# Patient Record
Sex: MALE | Race: WHITE | Employment: OTHER | URBAN - METROPOLITAN AREA
[De-identification: names, ages, dates, MRNs, and addresses within clinical notes are randomized per-mention and may not be internally consistent; named-entity substitution may affect disease eponyms.]

---

## 2023-08-23 PROBLEM — I77.89 ASCENDING AORTA ENLARGEMENT (CMS-HCC): Status: ACTIVE | Noted: 2023-08-23

## 2023-08-23 PROBLEM — K63.5 COLON POLYPS: Status: ACTIVE | Noted: 2023-08-23

## 2023-08-23 PROBLEM — D64.9 ANEMIA: Status: ACTIVE | Noted: 2023-08-23

## 2023-08-23 PROBLEM — R35.0 URINARY FREQUENCY: Status: ACTIVE | Noted: 2023-08-23

## 2023-08-23 PROBLEM — M25.551 CHRONIC RIGHT HIP PAIN: Status: ACTIVE | Noted: 2023-08-23

## 2023-08-23 PROBLEM — M54.41 CHRONIC RIGHT-SIDED LOW BACK PAIN WITH RIGHT-SIDED SCIATICA: Status: ACTIVE | Noted: 2023-08-23

## 2023-08-23 PROBLEM — M48.061 LUMBAR SPINAL STENOSIS: Status: ACTIVE | Noted: 2023-08-23

## 2023-08-23 PROBLEM — M51.36 DEGENERATION OF LUMBAR INTERVERTEBRAL DISC: Status: ACTIVE | Noted: 2023-08-23

## 2023-08-23 PROBLEM — L82.1 OTHER SEBORRHEIC KERATOSIS: Status: ACTIVE | Noted: 2020-03-02

## 2023-08-23 PROBLEM — R93.1 AGATSTON CAC SCORE, <100: Status: ACTIVE | Noted: 2023-08-23

## 2023-08-23 PROBLEM — M51.369 DEGENERATION OF LUMBAR INTERVERTEBRAL DISC: Status: ACTIVE | Noted: 2023-08-23

## 2023-08-23 PROBLEM — R73.03 PREDIABETES: Status: ACTIVE | Noted: 2023-08-23

## 2023-08-23 PROBLEM — G89.29 CHRONIC RIGHT-SIDED LOW BACK PAIN WITH RIGHT-SIDED SCIATICA: Status: ACTIVE | Noted: 2023-08-23

## 2023-08-23 PROBLEM — E78.00 HYPERCHOLESTEROLEMIA: Status: ACTIVE | Noted: 2023-08-23

## 2023-08-23 PROBLEM — G89.29 CHRONIC RIGHT HIP PAIN: Status: ACTIVE | Noted: 2023-08-23

## 2023-08-23 PROBLEM — I35.1 MODERATE AORTIC REGURGITATION: Status: ACTIVE | Noted: 2023-08-23

## 2023-08-23 PROBLEM — T63.441A: Status: ACTIVE | Noted: 2023-08-23

## 2023-08-23 PROBLEM — D47.2 MGUS (MONOCLONAL GAMMOPATHY OF UNKNOWN SIGNIFICANCE): Status: ACTIVE | Noted: 2023-08-23

## 2023-08-23 PROBLEM — B00.9 HERPESVIRAL INFECTION, UNSPECIFIED: Status: ACTIVE | Noted: 2020-03-02

## 2023-08-23 RX ORDER — EPINEPHRINE 0.3 MG/.3ML
0.3 INJECTION SUBCUTANEOUS
COMMUNITY
Start: 2019-11-06

## 2023-09-07 ENCOUNTER — OFFICE VISIT (OUTPATIENT)
Dept: PRIMARY CARE | Facility: CLINIC | Age: 68
End: 2023-09-07
Payer: MEDICARE

## 2023-09-07 VITALS
HEART RATE: 62 BPM | DIASTOLIC BLOOD PRESSURE: 74 MMHG | WEIGHT: 182 LBS | SYSTOLIC BLOOD PRESSURE: 116 MMHG | BODY MASS INDEX: 26.96 KG/M2 | OXYGEN SATURATION: 96 % | HEIGHT: 69 IN

## 2023-09-07 DIAGNOSIS — Z00.00 ROUTINE GENERAL MEDICAL EXAMINATION AT HEALTH CARE FACILITY: Primary | ICD-10-CM

## 2023-09-07 DIAGNOSIS — R73.03 PREDIABETES: ICD-10-CM

## 2023-09-07 DIAGNOSIS — D47.2 MGUS (MONOCLONAL GAMMOPATHY OF UNKNOWN SIGNIFICANCE): ICD-10-CM

## 2023-09-07 DIAGNOSIS — E78.00 HYPERCHOLESTEROLEMIA: ICD-10-CM

## 2023-09-07 DIAGNOSIS — R35.0 URINARY FREQUENCY: ICD-10-CM

## 2023-09-07 DIAGNOSIS — I77.89 ASCENDING AORTA ENLARGEMENT (CMS-HCC): ICD-10-CM

## 2023-09-07 DIAGNOSIS — Z28.20 IMMUNIZATION NOT CARRIED OUT BECAUSE OF PATIENT DECISION: ICD-10-CM

## 2023-09-07 DIAGNOSIS — Z12.5 SCREENING FOR PROSTATE CANCER: ICD-10-CM

## 2023-09-07 DIAGNOSIS — I35.1 MODERATE AORTIC REGURGITATION: ICD-10-CM

## 2023-09-07 DIAGNOSIS — M48.061 SPINAL STENOSIS OF LUMBAR REGION WITHOUT NEUROGENIC CLAUDICATION: ICD-10-CM

## 2023-09-07 PROCEDURE — 1170F FXNL STATUS ASSESSED: CPT | Performed by: INTERNAL MEDICINE

## 2023-09-07 PROCEDURE — 1160F RVW MEDS BY RX/DR IN RCRD: CPT | Performed by: INTERNAL MEDICINE

## 2023-09-07 PROCEDURE — 99214 OFFICE O/P EST MOD 30 MIN: CPT | Performed by: INTERNAL MEDICINE

## 2023-09-07 PROCEDURE — 1159F MED LIST DOCD IN RCRD: CPT | Performed by: INTERNAL MEDICINE

## 2023-09-07 PROCEDURE — G0439 PPPS, SUBSEQ VISIT: HCPCS | Performed by: INTERNAL MEDICINE

## 2023-09-07 PROCEDURE — 1036F TOBACCO NON-USER: CPT | Performed by: INTERNAL MEDICINE

## 2023-09-07 ASSESSMENT — ACTIVITIES OF DAILY LIVING (ADL)
MANAGING_FINANCES: INDEPENDENT
DRESSING: INDEPENDENT
TAKING_MEDICATION: INDEPENDENT
BATHING: INDEPENDENT
DOING_HOUSEWORK: INDEPENDENT
GROCERY_SHOPPING: INDEPENDENT

## 2023-09-07 ASSESSMENT — ENCOUNTER SYMPTOMS
DIZZINESS: 0
FATIGUE: 0
SHORTNESS OF BREATH: 0
BACK PAIN: 0

## 2023-09-07 ASSESSMENT — PATIENT HEALTH QUESTIONNAIRE - PHQ9
1. LITTLE INTEREST OR PLEASURE IN DOING THINGS: NOT AT ALL
2. FEELING DOWN, DEPRESSED OR HOPELESS: NOT AT ALL
SUM OF ALL RESPONSES TO PHQ9 QUESTIONS 1 AND 2: 0

## 2023-09-07 NOTE — PROGRESS NOTES
"Subjective   Reason for Visit: Bib Dai is an 68 y.o. male here for a Medicare Wellness visit and chronic medical problems.    Past Medical, Surgical, and Family History reviewed and updated in chart.    Reviewed all medications by prescribing practitioner or clinical pharmacist (such as prescriptions, OTCs, herbal therapies and supplements) and documented in the medical record.    Doing well.  Still beekeeping.  Weight down 9 pounds.  More active this summer.  Eating more salads from garden.  No CP or SOB.  No back pain.  No meds, labs reviewed.    Patient Care Team:  Miguel Elizondo MD as PCP - General  Miguel Elizondo MD as PCP - [employer] PCP     Review of Systems   Constitutional:  Negative for fatigue.   Respiratory:  Negative for shortness of breath.    Cardiovascular:  Negative for chest pain.   Musculoskeletal:  Negative for back pain.   Neurological:  Negative for dizziness.       Objective   Vitals:  /74 (BP Location: Right arm, Patient Position: Sitting)   Pulse 62   Ht 1.753 m (5' 9\")   Wt 82.6 kg (182 lb)   SpO2 96%   BMI 26.88 kg/m²       Physical Exam  Constitutional:       Appearance: Normal appearance.   Cardiovascular:      Rate and Rhythm: Normal rate and regular rhythm.      Pulses: Normal pulses.      Heart sounds: Normal heart sounds.   Pulmonary:      Effort: Pulmonary effort is normal.      Breath sounds: Normal breath sounds.   Abdominal:      General: Abdomen is flat. Bowel sounds are normal.      Palpations: Abdomen is soft.   Skin:     Findings: Lesion present.      Comments: Discussed fu with derm for skin lesion.   Neurological:      General: No focal deficit present.      Mental Status: He is alert.   Psychiatric:         Mood and Affect: Mood normal.         Thought Content: Thought content normal.         Judgment: Judgment normal.       Assessment/Plan   Problem List Items Addressed This Visit          Cardiac and Vasculature    Ascending aorta " enlargement (CMS/HCC)    Current Assessment & Plan     Mild enlargement 3.9 cm, noted 2021.  Recheck echo.         Relevant Orders    Transthoracic Echo (TTE) Complete    CBC and Auto Differential    Comprehensive metabolic panel    Hypercholesterolemia    Relevant Orders    CBC and Auto Differential    Comprehensive metabolic panel    Lipid panel    TSH with reflex to Free T4 if abnormal    Moderate aortic regurgitation    Current Assessment & Plan     Mild to moderate AR.  Recheck echo.         Relevant Medications    EPINEPHrine 0.3 mg/0.3 mL injection syringe    Other Relevant Orders    Transthoracic Echo (TTE) Complete    CBC and Auto Differential    Comprehensive metabolic panel       Endocrine/Metabolic    Prediabetes    Current Assessment & Plan     Check A1c.         Relevant Orders    Hemoglobin A1c       Genitourinary and Reproductive    Urinary frequency    Relevant Orders    Urinalysis with Reflex Microscopic    Screening for prostate cancer - Primary    Relevant Orders    Prostate Specific Antigen, Screen       Hematology and Neoplasia    MGUS (monoclonal gammopathy of unknown significance)    Current Assessment & Plan     Condition andres, followed with hematology.         Relevant Orders    CBC and Auto Differential    Comprehensive metabolic panel       Infectious Diseases    Immunization not carried out because of patient decision       Neuro    Lumbar spinal stenosis    Current Assessment & Plan     Not an active problem at this time.

## 2023-09-12 ENCOUNTER — LAB (OUTPATIENT)
Dept: LAB | Facility: LAB | Age: 68
End: 2023-09-12
Payer: MEDICARE

## 2023-09-12 DIAGNOSIS — D47.2 MGUS (MONOCLONAL GAMMOPATHY OF UNKNOWN SIGNIFICANCE): ICD-10-CM

## 2023-09-12 DIAGNOSIS — E78.00 HYPERCHOLESTEROLEMIA: ICD-10-CM

## 2023-09-12 DIAGNOSIS — I77.89 ASCENDING AORTA ENLARGEMENT (CMS-HCC): ICD-10-CM

## 2023-09-12 DIAGNOSIS — I35.1 MODERATE AORTIC REGURGITATION: ICD-10-CM

## 2023-09-12 DIAGNOSIS — R73.03 PREDIABETES: ICD-10-CM

## 2023-09-12 DIAGNOSIS — Z12.5 SCREENING FOR PROSTATE CANCER: ICD-10-CM

## 2023-09-12 DIAGNOSIS — R35.0 URINARY FREQUENCY: ICD-10-CM

## 2023-09-12 LAB
ALANINE AMINOTRANSFERASE (SGPT) (U/L) IN SER/PLAS: 14 U/L (ref 10–52)
ALBUMIN (G/DL) IN SER/PLAS: 4 G/DL (ref 3.4–5)
ALKALINE PHOSPHATASE (U/L) IN SER/PLAS: 43 U/L (ref 33–136)
ANION GAP IN SER/PLAS: 12 MMOL/L (ref 10–20)
APPEARANCE, URINE: CLEAR
ASPARTATE AMINOTRANSFERASE (SGOT) (U/L) IN SER/PLAS: 15 U/L (ref 9–39)
BASOPHILS (10*3/UL) IN BLOOD BY AUTOMATED COUNT: 0.06 X10E9/L (ref 0–0.1)
BASOPHILS/100 LEUKOCYTES IN BLOOD BY AUTOMATED COUNT: 1.4 % (ref 0–2)
BILIRUBIN TOTAL (MG/DL) IN SER/PLAS: 0.8 MG/DL (ref 0–1.2)
BILIRUBIN, URINE: NEGATIVE
BLOOD, URINE: NEGATIVE
CALCIUM (MG/DL) IN SER/PLAS: 9.3 MG/DL (ref 8.6–10.6)
CARBON DIOXIDE, TOTAL (MMOL/L) IN SER/PLAS: 29 MMOL/L (ref 21–32)
CHLORIDE (MMOL/L) IN SER/PLAS: 105 MMOL/L (ref 98–107)
CHOLESTEROL (MG/DL) IN SER/PLAS: 195 MG/DL (ref 0–199)
CHOLESTEROL IN HDL (MG/DL) IN SER/PLAS: 48.7 MG/DL
CHOLESTEROL/HDL RATIO: 4
COLOR, URINE: YELLOW
CREATININE (MG/DL) IN SER/PLAS: 0.91 MG/DL (ref 0.5–1.3)
EOSINOPHILS (10*3/UL) IN BLOOD BY AUTOMATED COUNT: 0.26 X10E9/L (ref 0–0.7)
EOSINOPHILS/100 LEUKOCYTES IN BLOOD BY AUTOMATED COUNT: 6.2 % (ref 0–6)
ERYTHROCYTE DISTRIBUTION WIDTH (RATIO) BY AUTOMATED COUNT: 14 % (ref 11.5–14.5)
ERYTHROCYTE MEAN CORPUSCULAR HEMOGLOBIN CONCENTRATION (G/DL) BY AUTOMATED: 34.4 G/DL (ref 32–36)
ERYTHROCYTE MEAN CORPUSCULAR VOLUME (FL) BY AUTOMATED COUNT: 94 FL (ref 80–100)
ERYTHROCYTES (10*6/UL) IN BLOOD BY AUTOMATED COUNT: 4.34 X10E12/L (ref 4.5–5.9)
ESTIMATED AVERAGE GLUCOSE FOR HBA1C: 111 MG/DL
GFR MALE: >90 ML/MIN/1.73M2
GLUCOSE (MG/DL) IN SER/PLAS: 88 MG/DL (ref 74–99)
GLUCOSE, URINE: NEGATIVE MG/DL
HEMATOCRIT (%) IN BLOOD BY AUTOMATED COUNT: 40.7 % (ref 41–52)
HEMOGLOBIN (G/DL) IN BLOOD: 14 G/DL (ref 13.5–17.5)
HEMOGLOBIN A1C/HEMOGLOBIN TOTAL IN BLOOD: 5.5 %
IMMATURE GRANULOCYTES/100 LEUKOCYTES IN BLOOD BY AUTOMATED COUNT: 0.2 % (ref 0–0.9)
KETONES, URINE: NEGATIVE MG/DL
LDL: 133 MG/DL (ref 0–99)
LEUKOCYTE ESTERASE, URINE: NEGATIVE
LEUKOCYTES (10*3/UL) IN BLOOD BY AUTOMATED COUNT: 4.2 X10E9/L (ref 4.4–11.3)
LYMPHOCYTES (10*3/UL) IN BLOOD BY AUTOMATED COUNT: 0.95 X10E9/L (ref 1.2–4.8)
LYMPHOCYTES/100 LEUKOCYTES IN BLOOD BY AUTOMATED COUNT: 22.6 % (ref 13–44)
MONOCYTES (10*3/UL) IN BLOOD BY AUTOMATED COUNT: 0.34 X10E9/L (ref 0.1–1)
MONOCYTES/100 LEUKOCYTES IN BLOOD BY AUTOMATED COUNT: 8.1 % (ref 2–10)
NEUTROPHILS (10*3/UL) IN BLOOD BY AUTOMATED COUNT: 2.59 X10E9/L (ref 1.2–7.7)
NEUTROPHILS/100 LEUKOCYTES IN BLOOD BY AUTOMATED COUNT: 61.5 % (ref 40–80)
NITRITE, URINE: NEGATIVE
NRBC (PER 100 WBCS) BY AUTOMATED COUNT: 0 /100 WBC (ref 0–0)
PH, URINE: 7 (ref 5–8)
PLATELETS (10*3/UL) IN BLOOD AUTOMATED COUNT: 166 X10E9/L (ref 150–450)
POTASSIUM (MMOL/L) IN SER/PLAS: 5.2 MMOL/L (ref 3.5–5.3)
PROSTATE SPECIFIC ANTIGEN,SCREEN: 2.87 NG/ML (ref 0–4)
PROTEIN TOTAL: 6.4 G/DL (ref 6.4–8.2)
PROTEIN, URINE: NEGATIVE MG/DL
SODIUM (MMOL/L) IN SER/PLAS: 141 MMOL/L (ref 136–145)
SPECIFIC GRAVITY, URINE: 1.02 (ref 1–1.03)
THYROTROPIN (MIU/L) IN SER/PLAS BY DETECTION LIMIT <= 0.05 MIU/L: 1.41 MIU/L (ref 0.44–3.98)
TRIGLYCERIDE (MG/DL) IN SER/PLAS: 68 MG/DL (ref 0–149)
UREA NITROGEN (MG/DL) IN SER/PLAS: 13 MG/DL (ref 6–23)
UROBILINOGEN, URINE: <2 MG/DL (ref 0–1.9)
VLDL: 14 MG/DL (ref 0–40)

## 2023-09-12 PROCEDURE — 81003 URINALYSIS AUTO W/O SCOPE: CPT

## 2023-09-12 PROCEDURE — 80053 COMPREHEN METABOLIC PANEL: CPT

## 2023-09-12 PROCEDURE — G0103 PSA SCREENING: HCPCS

## 2023-09-12 PROCEDURE — 36415 COLL VENOUS BLD VENIPUNCTURE: CPT

## 2023-09-12 PROCEDURE — 80061 LIPID PANEL: CPT

## 2023-09-12 PROCEDURE — 83036 HEMOGLOBIN GLYCOSYLATED A1C: CPT

## 2023-09-12 PROCEDURE — 85025 COMPLETE CBC W/AUTO DIFF WBC: CPT

## 2023-09-12 PROCEDURE — 84443 ASSAY THYROID STIM HORMONE: CPT

## 2023-11-11 ASSESSMENT — PATIENT HEALTH QUESTIONNAIRE - PHQ9
5. POOR APPETITE OR OVEREATING: NOT AT ALL
2. FEELING DOWN, DEPRESSED OR HOPELESS: NOT AT ALL
10. IF YOU CHECKED OFF ANY PROBLEMS, HOW DIFFICULT HAVE THESE PROBLEMS MADE IT FOR YOU TO DO YOUR WORK, TAKE CARE OF THINGS AT HOME, OR GET ALONG WITH OTHER PEOPLE: NOT DIFFICULT AT ALL
1. LITTLE INTEREST OR PLEASURE IN DOING THINGS: NOT AT ALL
8. MOVING OR SPEAKING SO SLOWLY THAT OTHER PEOPLE COULD HAVE NOTICED. OR THE OPPOSITE, BEING SO FIGETY OR RESTLESS THAT YOU HAVE BEEN MOVING AROUND A LOT MORE THAN USUAL: 0
2. FEELING DOWN, DEPRESSED, IRRITABLE, OR HOPELESS: NOT AT ALL
3. TROUBLE FALLING OR STAYING ASLEEP OR SLEEPING TOO MUCH: 0
4. FEELING TIRED OR HAVING LITTLE ENERGY: 0
5. POOR APPETITE OR OVEREATING: 0
4. FEELING TIRED OR HAVING LITTLE ENERGY: NOT AT ALL
5. POOR APPETITE OR OVEREATING: NOT AT ALL
7. TROUBLE CONCENTRATING ON THINGS, SUCH AS READING THE NEWSPAPER OR WATCHING TELEVISION: NOT AT ALL
10. IF YOU CHECKED OFF ANY PROBLEMS, HOW DIFFICULT HAVE THESE PROBLEMS MADE IT FOR YOU TO DO YOUR WORK, TAKE CARE OF THINGS AT HOME, OR GET ALONG WITH OTHER PEOPLE: NOT DIFFICULT AT ALL
7. TROUBLE CONCENTRATING ON THINGS, SUCH AS READING THE NEWSPAPER OR WATCHING TELEVISION: 0
9. THOUGHTS THAT YOU WOULD BE BETTER OFF DEAD, OR OF HURTING YOURSELF: 0
4. FEELING TIRED OR HAVING LITTLE ENERGY: NOT AT ALL
1. LITTLE INTEREST OR PLEASURE IN DOING THINGS: NOT AT ALL
9. THOUGHTS THAT YOU WOULD BE BETTER OFF DEAD, OR OF HURTING YOURSELF: NOT AT ALL
9. THOUGHTS THAT YOU WOULD BE BETTER OFF DEAD, OR OF HURTING YOURSELF: NOT AT ALL
7. TROUBLE CONCENTRATING ON THINGS, SUCH AS READING THE NEWSPAPER OR WATCHING TELEVISION: NOT AT ALL
8. MOVING OR SPEAKING SO SLOWLY THAT OTHER PEOPLE COULD HAVE NOTICED. OR THE OPPOSITE, BEING SO FIGETY OR RESTLESS THAT YOU HAVE BEEN MOVING AROUND A LOT MORE THAN USUAL: NOT AT ALL
6. FEELING BAD ABOUT YOURSELF - OR THAT YOU ARE A FAILURE OR HAVE LET YOURSELF OR YOUR FAMILY DOWN: NOT AT ALL
3. TROUBLE FALLING OR STAYING ASLEEP OR SLEEPING TOO MUCH: NOT AT ALL
6. FEELING BAD ABOUT YOURSELF - OR THAT YOU ARE A FAILURE OR HAVE LET YOURSELF OR YOUR FAMILY DOWN: 0
SUM OF ALL RESPONSES TO PHQ QUESTIONS 1-9: 0
8. MOVING OR SPEAKING SO SLOWLY THAT OTHER PEOPLE COULD HAVE NOTICED. OR THE OPPOSITE, BEING SO FIGETY OR RESTLESS THAT YOU HAVE BEEN MOVING AROUND A LOT MORE THAN USUAL: NOT AT ALL
1. LITTLE INTEREST OR PLEASURE IN DOING THINGS: 0
2. FEELING DOWN, DEPRESSED, IRRITABLE, OR HOPELESS: 0
3. TROUBLE FALLING OR STAYING ASLEEP OR SLEEPING TOO MUCH: NOT AT ALL
SUM OF ALL RESPONSES TO PHQ QUESTIONS 1-9: 0
6. FEELING BAD ABOUT YOURSELF - OR THAT YOU ARE A FAILURE OR HAVE LET YOURSELF OR YOUR FAMILY DOWN: NOT AT ALL

## 2023-11-15 ENCOUNTER — OFFICE VISIT (OUTPATIENT)
Dept: HEMATOLOGY/ONCOLOGY | Facility: CLINIC | Age: 68
End: 2023-11-15
Payer: MEDICARE

## 2023-11-15 ENCOUNTER — LAB (OUTPATIENT)
Dept: LAB | Facility: CLINIC | Age: 68
End: 2023-11-15
Payer: MEDICARE

## 2023-11-15 VITALS
SYSTOLIC BLOOD PRESSURE: 122 MMHG | DIASTOLIC BLOOD PRESSURE: 82 MMHG | HEART RATE: 61 BPM | WEIGHT: 186.4 LBS | OXYGEN SATURATION: 98 % | TEMPERATURE: 97.2 F | BODY MASS INDEX: 27.61 KG/M2 | RESPIRATION RATE: 14 BRPM | HEIGHT: 69 IN

## 2023-11-15 DIAGNOSIS — D89.89 OTHER SPECIFIED DISORDERS INVOLVING THE IMMUNE MECHANISM, NOT ELSEWHERE CLASSIFIED (MULTI): ICD-10-CM

## 2023-11-15 DIAGNOSIS — D47.2 MGUS (MONOCLONAL GAMMOPATHY OF UNKNOWN SIGNIFICANCE): Primary | ICD-10-CM

## 2023-11-15 DIAGNOSIS — D47.2 MONOCLONAL GAMMOPATHY: Primary | ICD-10-CM

## 2023-11-15 LAB
ANION GAP SERPL CALC-SCNC: 14 MMOL/L (ref 10–20)
BASOPHILS # BLD AUTO: 0.03 X10*3/UL (ref 0–0.1)
BASOPHILS NFR BLD AUTO: 0.6 %
BUN SERPL-MCNC: 16 MG/DL (ref 6–23)
CALCIUM SERPL-MCNC: 9.4 MG/DL (ref 8.6–10.6)
CHLORIDE SERPL-SCNC: 103 MMOL/L (ref 98–107)
CO2 SERPL-SCNC: 28 MMOL/L (ref 21–32)
CREAT SERPL-MCNC: 0.91 MG/DL (ref 0.5–1.3)
EOSINOPHIL # BLD AUTO: 0.27 X10*3/UL (ref 0–0.7)
EOSINOPHIL NFR BLD AUTO: 5.8 %
ERYTHROCYTE [DISTWIDTH] IN BLOOD BY AUTOMATED COUNT: 14.3 % (ref 11.5–14.5)
GFR SERPL CREATININE-BSD FRML MDRD: >90 ML/MIN/1.73M*2
GLUCOSE SERPL-MCNC: 99 MG/DL (ref 74–99)
HCT VFR BLD AUTO: 42.1 % (ref 41–52)
HGB BLD-MCNC: 13.8 G/DL (ref 13.5–17.5)
IGA SERPL-MCNC: 486 MG/DL (ref 70–400)
IGG SERPL-MCNC: 934 MG/DL (ref 700–1600)
IGM SERPL-MCNC: 53 MG/DL (ref 40–230)
IMM GRANULOCYTES # BLD AUTO: 0.01 X10*3/UL (ref 0–0.7)
IMM GRANULOCYTES NFR BLD AUTO: 0.2 % (ref 0–0.9)
LYMPHOCYTES # BLD AUTO: 1.08 X10*3/UL (ref 1.2–4.8)
LYMPHOCYTES NFR BLD AUTO: 23.2 %
MCH RBC QN AUTO: 31.3 PG (ref 26–34)
MCHC RBC AUTO-ENTMCNC: 32.8 G/DL (ref 32–36)
MCV RBC AUTO: 96 FL (ref 80–100)
MONOCYTES # BLD AUTO: 0.36 X10*3/UL (ref 0.1–1)
MONOCYTES NFR BLD AUTO: 7.7 %
NEUTROPHILS # BLD AUTO: 2.9 X10*3/UL (ref 1.2–7.7)
NEUTROPHILS NFR BLD AUTO: 62.5 %
NRBC BLD-RTO: ABNORMAL /100{WBCS}
PLATELET # BLD AUTO: 190 X10*3/UL (ref 150–450)
POTASSIUM SERPL-SCNC: 4.6 MMOL/L (ref 3.5–5.3)
PROT SERPL-MCNC: 6.7 G/DL (ref 6.4–8.2)
RBC # BLD AUTO: 4.41 X10*6/UL (ref 4.5–5.9)
SODIUM SERPL-SCNC: 140 MMOL/L (ref 136–145)
WBC # BLD AUTO: 4.7 X10*3/UL (ref 4.4–11.3)

## 2023-11-15 PROCEDURE — 1036F TOBACCO NON-USER: CPT | Performed by: NURSE PRACTITIONER

## 2023-11-15 PROCEDURE — 86320 SERUM IMMUNOELECTROPHORESIS: CPT | Performed by: PATHOLOGY

## 2023-11-15 PROCEDURE — 99214 OFFICE O/P EST MOD 30 MIN: CPT | Performed by: NURSE PRACTITIONER

## 2023-11-15 PROCEDURE — 1159F MED LIST DOCD IN RCRD: CPT | Performed by: NURSE PRACTITIONER

## 2023-11-15 PROCEDURE — 83521 IG LIGHT CHAINS FREE EACH: CPT

## 2023-11-15 PROCEDURE — 1160F RVW MEDS BY RX/DR IN RCRD: CPT | Performed by: NURSE PRACTITIONER

## 2023-11-15 PROCEDURE — 84165 PROTEIN E-PHORESIS SERUM: CPT | Performed by: PATHOLOGY

## 2023-11-15 PROCEDURE — 80048 BASIC METABOLIC PNL TOTAL CA: CPT

## 2023-11-15 PROCEDURE — 84165 PROTEIN E-PHORESIS SERUM: CPT

## 2023-11-15 PROCEDURE — 36415 COLL VENOUS BLD VENIPUNCTURE: CPT

## 2023-11-15 PROCEDURE — 84155 ASSAY OF PROTEIN SERUM: CPT

## 2023-11-15 PROCEDURE — 82784 ASSAY IGA/IGD/IGG/IGM EACH: CPT

## 2023-11-15 PROCEDURE — 1126F AMNT PAIN NOTED NONE PRSNT: CPT | Performed by: NURSE PRACTITIONER

## 2023-11-15 PROCEDURE — 85025 COMPLETE CBC W/AUTO DIFF WBC: CPT

## 2023-11-15 RX ORDER — IBUPROFEN 200 MG
200-400 TABLET ORAL EVERY 2 HOUR PRN
COMMUNITY
Start: 2012-06-11

## 2023-11-15 RX ORDER — OXYCODONE AND ACETAMINOPHEN 5; 325 MG/1; MG/1
1 TABLET ORAL EVERY 6 HOURS PRN
COMMUNITY
Start: 2012-06-11 | End: 2024-01-15 | Stop reason: WASHOUT

## 2023-11-15 ASSESSMENT — PAIN SCALES - GENERAL: PAINLEVEL: 0-NO PAIN

## 2023-11-15 NOTE — PROGRESS NOTES
Patient to follow-up in 2 weeks.  Call back instructions reviewed.  Patient verbalized understanding.

## 2023-11-15 NOTE — PROGRESS NOTES
Visit Type: Benign Heme Follow-up      History of Present Illness:      Chief Complaint: low wbc   Interval History:    Location: [facility]  Initial consult: 11/11/21  Reason: low wbc  DX: MGUS     Patient is a 69 yo male with a PMH of Moderate aortic regurgitation, aortic enlg, HLP, OA, PreDM, arthritis and was referred to benign hematology for consultation of leukopenia. Referred by Dr. Elizondo.     Today, patient presents for followup.  No new issues. The patient has not noted fever, chills, drenching night sweats, infectious symptoms, weight loss, bone pain, hemorrhage from any site, melena, or respiratory symptoms.       and has bee sting allergy - has EPI pen but has never needed to use it.      SH: Diet - reg, Denies Tobacco, Rec drug use, or Radiation exposure. Occ etoh.  by [employer]. Originally from [place]  PSH: n/a  FH: DM, HTN; daughter with tubular sclerosis; arthritis; alzheimer's   Meds: see list      Review of Systems:   Review of Systems:    10 point review of systems negative except as state in HPI.         Allergies and Intolerances:       Allergies:         NKDA: Active         Bee Stings: Environment, Swelling/Edema, Itching, Active     Outpatient Medication Profile:  * No Current Medications as of 16-Nov-2022 08:54 documented in Structured Notes     Social History:   Social Substance History:  ·  Smoking Status never smoker   ·  Tobacco Use denies   ·  Alcohol Use occasionally   ·  Drug Use denies        Physical Exam:      Constitutional: Well developed, awake/alert/oriented  x3, no distress, alert and cooperative   Eyes: clear sclera   ENMT: mucous membranes moist   Head/Neck: Neck supple, no apparent injury, trachea  midline   Respiratory/Thorax: Patent airways, CTAB, normal  breath sounds with good chest expansion, thorax symmetric   Cardiovascular: Regular, rate and rhythm, no murmurs,  normal S 1and S 2   Gastrointestinal: Abdomen soft nontender, nondistended,  + bowel  sounds.   No organomegaly   Musculoskeletal: ROM intact, no joint swelling, normal  strength   Extremities: normal extremities, no cyanosis, no  clubbing, no edema   Neurological: Patient is alert and oriented x3. Nonfocal  exam. No myoclonus      Lymphatic: No significant lymphadenopathy   Psychological: Pleasant, appropriate, and easily  engaged   Skin: Warm and dry, no lesions, no rashes         Lab Results:     ·  Results        CBC date/time       WBC     HGB     HCT     PLT     Neut      31-Aug-2022 08:43   4.4     14.1    42.5    181     2.59  11-Nov-2021 11:50   4.5     14.3    43.6    165     3.32  15-Oct-2021 09:10   3.8(L)  13.1(L) 40.2(L) 172     2.15  16-Aug-2021 07:31   4.7     13.1(L) 39.9(L) 151     3.07  06-Aug-2020 09:08   3.8(L)  13.7    41.3    153     2.38  06-Aug-2019 09:13   3.9(L)  14.1    44.1    167     2.18  30-Jul-2018 07:59   4.2(L)  13.5    40.9(L)  158     2.22     BMP date/time       NA              K               CL              CO2             BUN             CREAT             31-Aug-2022 08:43   137             4.8             101             N/A             16              0.96  16-Aug-2021 07:31   141             4.7             105             N/A             16              0.92  06-Aug-2020 09:08   140             4.6             104             N/A             17              1.02  06-Aug-2019 09:13   140             4.7             103             N/A             21              1.05  30-Jul-2018 07:59   141             4.7             104             N/A             18              0.97  22-Nov-2017 10:25   N/A             N/A             N/A             N/A             N/A             N/A  18-Jul-2017 08:06   143             4.9             107             N/A             19              1.07     LDH date/time       LDH     11-Nov-2021 11:51   121        I have reviewed these laboratory results:     Albumin, Urine Spot  01-Nov-2022 09:08:00       Result Value    Albumin  Urine  <7.0    Reference Range: Not Established    Albumin/Creat Ratio, Urine  SEE COMMENT One or more analytes used in this calculation   is outside of the analytical measurement range.  Calculation cannot be performed.    Creatinine, Urine Spot  46.2       Kappa/Lambda Quant Free Light Chains  01-Nov-2022 09:08:00       Result Value    Free Kappa Light Chains, S  12.36   H   Free Lambda Light Chains, S  1.10    Free Kappa/Lambda Ratio, S  11.24   H      Immunoglobulins (G,A,M)  01-Nov-2022 09:08:00       Result Value    Immunoglobulin G Level, Serum  828    Immunoglobulin A Level, Serum  407   H   Immunoglobulin M Level, Serum  55       Path Review SPE  01-Nov-2022 09:08:00       Result Value    Path Review-SPE  A.PATTERSON       Protein Electrophoresis + IF, Serum.  01-Nov-2022 09:08:00       Result Value    T Pro  6.6    Albumin Serum  4.1    Alpha 1 Globulin  0.2    Alpha 2 Globulin  0.6    Beta Globulin  1.0    Gamma Globulin  0.7    M-Protein 1  0.3   A   Interpretation, Electrophoresis  ABNORMAL    Immunofixation Interpretation, Serum  ABNORMAL       Beta-2-Microglobulin, Serum  01-Nov-2022 09:08:00       Result Value    Beta-2-Microglobulin, Serum  1.8       RONDA Path Reveiw  01-Nov-2022 09:08:00       Result Value    Path Review-RONDA  A.PATTERSON       Urinalysis  31-Aug-2022 08:43:00       Result Value    Color, Urine  YELLOW    Reference Range: STRAW,YELLOW    Appearance, Urine  CLEAR    Specific Gravity, Urine  1.014    pH, Urine  7.0    Protein, Urine  NEGATIVE    Glucose, Urine  NEGATIVE    Blood, Urine  NEGATIVE    Ketones, Urine  NEGATIVE    Bilirubin, Urine  NEGATIVE    Urobilinogen, Urine  <2.0    Nitrite, Urine  NEGATIVE    Leukocyte Esterase, Urine  NEGATIVE       Comprehensive Metabolic Panel  31-Aug-2022 08:43:00       Result Value    Glucose, Serum  96    NA  137    K  4.8    CL  101    Bicarbonate, Serum  31    Anion Gap, Serum  10    BUN  16    CREAT  0.96    GFR Male  86    Calcium, Serum  9.6     ALB  4.2    ALKP  47    T Pro  6.5    T Bili  0.9    Alanine Aminotransferase, Serum  17    Aspartate Transaminase, Serum  13       Lipid Panel  31-Aug-2022 08:43:00       Result Value    Cholesterol, Serum  232 .      AGE      DESIRABLE   BORDERLINE HIGH   HIGH     0-19 Y     0 - 169       170 - 199     >/= 200    20-24 Y     0 - 189       190 - 224     >/= 225         >24 Y     0 - 199       200 - 239     >/= 240   **All ranges are based on fasting samp   H   HDL Cholesterol, Serum  45.7 .      AGE      VERY LOW   LOW     NORMAL    HIGH       0-19 Y       < 35   < 40     40-45     ----    20-24 Y       ----   < 40       >45     ----      >24 Y       ----   < 40     40-60      >60  .    Cholesterol/HDL Ratio  5.1 REF VALUES  DESIRABLE  < 3.4  HIGH RISK  > 5.0   A   LDL, Level  168 .                           NEAR      BORD      AGE      DESIRABLE  OPTIMAL    HIGH     HIGH     VERY HIGH     0-19 Y     0 - 109     ---    110-129   >/= 130     ----    20-24 Y     0 - 119     ---    120-159   >/= 160     ----      >24 Y     0 -   H   VLDL, Serum  18    Triglycerides, Serum  91 .      AGE      DESIRABLE   BORDERLINE HIGH   HIGH     VERY HIGH   0 D-90 D    19 - 174         ----         ----        ----  91 D- 9 Y     0 -  74        75 -  99     >/= 100      ----    10-19 Y     0 -  89        90 - 129     >/= 130      ----       Complete Blood Count + Differential  31-Aug-2022 08:43:00       Result Value    White Blood Cell Count  4.4    Nucleated Erythrocyte Count  0.0    Red Blood Cell Count  4.41   L   HGB  14.1    HCT  42.5    MCV  96    MCHC  33.2    PLT  181    RDW-CV  14.3    Neutrophil %  58.7    Immature Granulocytes %  0.2    Lymphocyte %  22.6    Monocyte %  8.1    Eosinophil %  8.8    Basophil %  1.6    Neutrophil Count  2.59    Lymphocyte Count  1.00   L   Monocyte Count  0.36    Eosinophil Count  0.39    Basophil Count  0.07       Thyroid Stimulating Hormone, Serum  31-Aug-2022 08:43:00       Result Value     Thyroid Stimulating Hormone, Serum  1.95       Prostate Spec.Ag, Screen  31-Aug-2022 08:43:00       Result Value    Prostate Spec.Ag, Screen  2.74          Assessment and Plan:      Assessment and Plan:   Assessment:    Location: [facility]  Initial consult: 11/11/21  Reason: low wbc  DX: MGUS     Patient is a 67 yo male with a PMH of Moderate aortic regurgitation, aortic enlg, HLP, OA, PreDM and was referred to benign hematology for consultation of leukopenia. Referred by Dr. Elizondo.     Today, patient presents for followup. I reviewed patient's chart including but not limited to labs, imaging, surgical/procedure notes, pathology, hospital notes, doctor's notes. Has had since 1994.  Asymptomatic - no recurrent infections. No B symptoms  or abnormal bruising/bleeding. Up to date on cancer screenings.      Past Infectious and inflammatory workup was normal/negative. Peripheral flow cytometry normal.      SPEP revealed Monoclonal IgA kappa in the beta region at 0.3 g/dL. IgA mildly elevated. FLC ratio elevated at 8.5, 24hr urine EP with monoclonal 45.7 per 24hr. Osseous bone survey for lytic lesions.  Does not meet CRAB criteria (Ca normal, No kidney dx,  no anemia, no bone dx). Discussed diagnosis of MGUS and s/s of worsening or transforming of disease.      - 11/2022, continues to be stable. IgA kappa monoclonal unchanged at 0.3, IgA mildly improved at 407. FLC ratio stable 11.24. BMP and CBC stable - Mild chronic leukopenia, specifically lymphocytopenia. Does not meet CRAB criteria    11/2023 - Asymptomatic. Physical exam unremarkable. Did not get labs drawn before appt - will get today (cbcd, bmp, igg gabriel, spep, flc). Discussed possible BMBX for elevated FLC ratio     Plan  1. Fuv 2 weeks     I had an extensive discussion with the patient regarding the diagnosis and discussed the plan of therapy, including general considerations regarding side effects and outcomes. Pt understood and gave appropriate teach back  about the plan of care. All questions  were answered to the patient's satisfaction. The patient is instructed to contact us at any time if questions or problems arise. Thank you for the opportunity to participate in the care of this very pleasant patient.     Total time =30 minutes. 50% or more of this time was spent in counseling and/or coordination of care including reviewing medical history/radiology/labs, examining patient, formulating outlined plan with team, and discussing plan with patient/family.

## 2023-11-16 LAB
KAPPA LC SERPL-MCNC: 17.59 MG/DL (ref 0.33–1.94)
KAPPA LC/LAMBDA SER: 15.99 {RATIO} (ref 0.26–1.65)
LAMBDA LC SERPL-MCNC: 1.1 MG/DL (ref 0.57–2.63)

## 2023-11-19 LAB
ALBUMIN: 4.1 G/DL (ref 3.4–5)
ALPHA 1 GLOBULIN: 0.2 G/DL (ref 0.2–0.6)
ALPHA 2 GLOBULIN: 0.6 G/DL (ref 0.4–1.1)
BETA GLOBULIN: 1 G/DL (ref 0.5–1.2)
GAMMA GLOBULIN: 0.7 G/DL (ref 0.5–1.4)
IMMUNOFIXATION COMMENT: ABNORMAL
M-PROTEIN 1: 0.3 G/DL
PATH REVIEW - SERUM IMMUNOFIXATION: ABNORMAL
PATH REVIEW-SERUM PROTEIN ELECTROPHORESIS: ABNORMAL
PROTEIN ELECTROPHORESIS COMMENT: ABNORMAL

## 2023-11-21 LAB
CELL COUNT (BLOOD): 4.7 X10*3/UL
CELL POPULATIONS: NORMAL
DIAGNOSIS: NORMAL
FLOW DIFFERENTIAL: NORMAL
FLOW TEST ORDERED: NORMAL
LAB TEST METHOD: NORMAL
NUMBER OF CELLS COLLECTED: NORMAL PER TUBE
PATH REPORT.TOTAL CANCER: NORMAL
SIGNATURE COMMENT: NORMAL
SPECIMEN VIABILITY: NORMAL

## 2023-11-29 ENCOUNTER — OFFICE VISIT (OUTPATIENT)
Dept: HEMATOLOGY/ONCOLOGY | Facility: CLINIC | Age: 68
End: 2023-11-29
Payer: MEDICARE

## 2023-11-29 VITALS
RESPIRATION RATE: 12 BRPM | BODY MASS INDEX: 28.03 KG/M2 | DIASTOLIC BLOOD PRESSURE: 77 MMHG | HEART RATE: 75 BPM | HEIGHT: 69 IN | WEIGHT: 189.26 LBS | SYSTOLIC BLOOD PRESSURE: 122 MMHG | OXYGEN SATURATION: 100 % | TEMPERATURE: 96.6 F

## 2023-11-29 DIAGNOSIS — D47.2 MGUS (MONOCLONAL GAMMOPATHY OF UNKNOWN SIGNIFICANCE): ICD-10-CM

## 2023-11-29 PROCEDURE — 1159F MED LIST DOCD IN RCRD: CPT | Performed by: NURSE PRACTITIONER

## 2023-11-29 PROCEDURE — 99214 OFFICE O/P EST MOD 30 MIN: CPT | Performed by: NURSE PRACTITIONER

## 2023-11-29 PROCEDURE — 1036F TOBACCO NON-USER: CPT | Performed by: NURSE PRACTITIONER

## 2023-11-29 PROCEDURE — 1126F AMNT PAIN NOTED NONE PRSNT: CPT | Performed by: NURSE PRACTITIONER

## 2023-11-29 PROCEDURE — 1160F RVW MEDS BY RX/DR IN RCRD: CPT | Performed by: NURSE PRACTITIONER

## 2023-11-29 ASSESSMENT — PAIN SCALES - GENERAL: PAINLEVEL: 0-NO PAIN

## 2023-11-29 NOTE — PROGRESS NOTES
Patient to see Darryl Calloway in the next 1-2 months.  PI on MM provided.  Call back instructions reviewed.  Patient verbalized understanding.

## 2023-11-29 NOTE — PROGRESS NOTES
Visit Type: Benign Heme Follow-up      History of Present Illness:      Chief Complaint: low wbc   Interval History:    Location: [facility]  Initial consult: 11/11/21  Reason: low wbc  DX: MGUS     Patient is a 69 yo male with a PMH of Moderate aortic regurgitation, aortic enlg, HLP, OA, PreDM, arthritis and was referred to benign hematology for consultation of leukopenia. Referred by Dr. Elizondo.     Today, patient presents for followup.  No new issues. The patient has not noted fever, chills, drenching night sweats, infectious symptoms, weight loss, bone pain, hemorrhage from any site, melena, or respiratory symptoms.       and has bee sting allergy - has EPI pen but has never needed to use it.      SH: Diet - reg, Denies Tobacco, Rec drug use, or Radiation exposure. Occ etoh.  by [employer]. Originally from [place]  PSH: n/a  FH: DM, HTN; daughter with tubular sclerosis; arthritis; alzheimer's   Meds: see list      Review of Systems:   Review of Systems:    10 point review of systems negative except as state in HPI.         Allergies and Intolerances:       Allergies:         NKDA: Active         Bee Stings: Environment, Swelling/Edema, Itching, Active     Outpatient Medication Profile:  * No Current Medications as of 16-Nov-2022 08:54 documented in Structured Notes     Social History:   Social Substance History:  ·  Smoking Status never smoker   ·  Tobacco Use denies   ·  Alcohol Use occasionally   ·  Drug Use denies        Physical Exam:      Constitutional: Well developed, awake/alert/oriented  x3, no distress, alert and cooperative   Eyes: clear sclera   ENMT: mucous membranes moist   Head/Neck: Neck supple, no apparent injury, trachea  midline   Respiratory/Thorax: Patent airways, CTAB, normal  breath sounds with good chest expansion, thorax symmetric   Cardiovascular: Regular, rate and rhythm, no murmurs,  normal S 1and S 2   Gastrointestinal: Abdomen soft nontender, nondistended,  + bowel  sounds.   No organomegaly   Musculoskeletal: ROM intact, no joint swelling, normal  strength   Extremities: normal extremities, no cyanosis, no  clubbing, no edema   Neurological: Patient is alert and oriented x3. Nonfocal  exam. No myoclonus      Lymphatic: No significant lymphadenopathy   Psychological: Pleasant, appropriate, and easily  engaged   Skin: Warm and dry, no lesions, no rashes         Lab Results:     ·  Results        CBC date/time       WBC     HGB     HCT     PLT     Neut      31-Aug-2022 08:43   4.4     14.1    42.5    181     2.59  11-Nov-2021 11:50   4.5     14.3    43.6    165     3.32  15-Oct-2021 09:10   3.8(L)  13.1(L) 40.2(L) 172     2.15  16-Aug-2021 07:31   4.7     13.1(L) 39.9(L) 151     3.07  06-Aug-2020 09:08   3.8(L)  13.7    41.3    153     2.38  06-Aug-2019 09:13   3.9(L)  14.1    44.1    167     2.18  30-Jul-2018 07:59   4.2(L)  13.5    40.9(L)  158     2.22     BMP date/time       NA              K               CL              CO2             BUN             CREAT             31-Aug-2022 08:43   137             4.8             101             N/A             16              0.96  16-Aug-2021 07:31   141             4.7             105             N/A             16              0.92  06-Aug-2020 09:08   140             4.6             104             N/A             17              1.02  06-Aug-2019 09:13   140             4.7             103             N/A             21              1.05  30-Jul-2018 07:59   141             4.7             104             N/A             18              0.97  22-Nov-2017 10:25   N/A             N/A             N/A             N/A             N/A             N/A  18-Jul-2017 08:06   143             4.9             107             N/A             19              1.07     LDH date/time       LDH     11-Nov-2021 11:51   121   Latest Reference Range & Units 11/15/23 09:10   GLUCOSE 74 - 99 mg/dL 99   SODIUM 136 - 145 mmol/L 140   POTASSIUM 3.5 - 5.3  mmol/L 4.6   CHLORIDE 98 - 107 mmol/L 103   Bicarbonate 21 - 32 mmol/L 28   Anion Gap 10 - 20 mmol/L 14   Blood Urea Nitrogen 6 - 23 mg/dL 16   Creatinine 0.50 - 1.30 mg/dL 0.91   EGFR >60 mL/min/1.73m*2 >90   Calcium 8.6 - 10.6 mg/dL 9.4   Total Protein 6.4 - 8.2 g/dL 6.7   WBC 4.4 - 11.3 x10*3/uL 4.7   nRBC  COMMENT ONLY   RBC 4.50 - 5.90 x10*6/uL 4.41 (L)   HEMOGLOBIN 13.5 - 17.5 g/dL 13.8   HEMATOCRIT 41.0 - 52.0 % 42.1   MCV 80 - 100 fL 96   MCH 26.0 - 34.0 pg 31.3   MCHC 32.0 - 36.0 g/dL 32.8   RED CELL DISTRIBUTION WIDTH 11.5 - 14.5 % 14.3   Platelets 150 - 450 x10*3/uL 190   Neutrophils % 40.0 - 80.0 % 62.5   Immature Granulocytes %, Automated 0.0 - 0.9 % 0.2   Lymphocytes % 13.0 - 44.0 % 23.2   Monocytes % 2.0 - 10.0 % 7.7   Eosinophils % 0.0 - 6.0 % 5.8   Basophils % 0.0 - 2.0 % 0.6   Neutrophils Absolute 1.20 - 7.70 x10*3/uL 2.90   Immature Granulocytes Absolute, Automated 0.00 - 0.70 x10*3/uL 0.01   Lymphocytes Absolute 1.20 - 4.80 x10*3/uL 1.08 (L)   Monocytes Absolute 0.10 - 1.00 x10*3/uL 0.36   Eosinophils Absolute 0.00 - 0.70 x10*3/uL 0.27   Basophils Absolute 0.00 - 0.10 x10*3/uL 0.03   FLOW CYTOMETRY TEST (PERFORMABLE)  Rpt   Protein Electrophoresis Comment  Aberrant band detected. See immunofixation.   Albumin 3.4 - 5.0 g/dL 4.1   Alpha 1 Globulin 0.2 - 0.6 g/dL 0.2   Alpha 2 Globulin 0.4 - 1.1 g/dL 0.6   Gamma 0.5 - 1.4 g/dL 0.7   Beta Globulin 0.5 - 1.2 g/dL 1.0   IgG 700 - 1,600 mg/dL 934   IgM 40 - 230 mg/dL 53   IgA 70 - 400 mg/dL 486 (H)   Ig Kappa Free Light Chain 0.33 - 1.94 mg/dL 17.59 (H)   Ig Lambda Free Light Chain 0.57 - 2.63 mg/dL 1.10   Kappa/Lambda Ratio 0.26 - 1.65  15.99 (H)   Immunofixation Comment  Known monoclonal IgA kappa in the beta region at 0.3 g/dL. Unchanged from the previous analysis on 11/1/22.   Path Review - Serum Immunofixation  Reviewed and approved by BIN ALCALA on 11/19/23 at 9:19 AM.      Path Review - Serum Protein Electrophoresis   Reviewed and approved  by BIN ALCALA on 11/19/23 at 9:19 AM.      M-PROTEIN 1 g/dL 0.3 (H)   (L): Data is abnormally low  (H): Data is abnormally high  Rpt: View report in Results Review for more information   PERIPHERAL BLOOD, FLOW CYTOMETRY:  - No immunophenotypic evidence of a lymphoproliferative disorder     Note: No monotypic B-cell or immunophenotypically aberrant T-cell population is identified. No distinct plasma cell population is detected. A non-specific relative increase in NK cells is noted. Correlation with clinical history and other laboratory data is recommended.      Assessment and Plan:      Assessment and Plan:   Assessment:    Location: [facility]  Initial consult: 11/11/21  Reason: low wbc  DX: MGUS     Patient is a 67 yo male with a PMH of Moderate aortic regurgitation, aortic enlg, HLP, OA, PreDM and was referred to benign hematology for consultation of leukopenia. Referred by Dr. Elizondo.     Today, patient presents for followup. I reviewed patient's chart including but not limited to labs, imaging, surgical/procedure notes, pathology, hospital notes, doctor's notes. Has had since 1994.  Asymptomatic - no recurrent infections. No B symptoms  or abnormal bruising/bleeding. Up to date on cancer screenings.      Past Infectious and inflammatory workup was normal/negative. Peripheral flow cytometry normal.      SPEP revealed Monoclonal IgA kappa in the beta region at 0.3 g/dL. IgA mildly elevated. FLC ratio elevated at 8.5, 24hr urine EP with monoclonal 45.7 per 24hr. Osseous bone survey for lytic lesions.  Does not meet CRAB criteria (Ca normal, No kidney dx,  no anemia, no bone dx). Discussed diagnosis of MGUS and s/s of worsening or transforming of disease.      - 11/2022, continues to be stable. IgA kappa monoclonal unchanged at 0.3, IgA mildly improved at 407. FLC ratio 11.24. BMP and CBC stable - Mild chronic leukopenia, specifically lymphocytopenia. Does not meet CRAB criteria    11/15/2023 - Asymptomatic.  Physical exam unremarkable. Did not get labs drawn before appt - will get today (cbc w/diff, flow cytom, bmp, spep, flc, iggs). Discussed possible BMBX for elevated FLC ratio        - IgA kappa monoclonal unchanged at 0.3, IgA mildly elevated at 486. FLC ratio 15.99. BMP and CBC stable - Mild chronic leukopenia, specifically lymphocytopenia. Periph flow cytometry stable. Does not meet CRAB criteria              - Discussed in detail recommendation to get BMBX according to updated guidelines as FLC ratio is above 8 and has IgA M protein. Likely MGUS at this point but BMBX is gold standard. After long discussion, pt would like to hold off BMBX for now. I will be having him follow up with Darryl Calloway CNP who has an MGUS clinic in the next 1-2 months. Pt would like to discuss his case with him before proceeding with more invasive testing. Pt given pt education on MM as this type of M protein usually transforms into MM and he was previously given pt education on MGUS.      I had an extensive discussion with the patient regarding the diagnosis and discussed the plan of therapy, including general considerations regarding side effects and outcomes. Pt understood and gave appropriate teach back about the plan of care. All questions  were answered to the patient's satisfaction. The patient is instructed to contact us at any time if questions or problems arise. Thank you for the opportunity to participate in the care of this very pleasant patient.     Total time =30 minutes. 50% or more of this time was spent in counseling and/or coordination of care including reviewing medical history/radiology/labs, examining patient, formulating outlined plan with team, and discussing plan with patient/family.

## 2024-01-08 ENCOUNTER — APPOINTMENT (OUTPATIENT)
Dept: HEMATOLOGY/ONCOLOGY | Facility: CLINIC | Age: 69
End: 2024-01-08
Payer: MEDICARE

## 2024-01-15 ENCOUNTER — OFFICE VISIT (OUTPATIENT)
Dept: HEMATOLOGY/ONCOLOGY | Facility: CLINIC | Age: 69
End: 2024-01-15
Payer: MEDICARE

## 2024-01-15 ENCOUNTER — APPOINTMENT (OUTPATIENT)
Dept: LAB | Facility: CLINIC | Age: 69
End: 2024-01-15
Payer: MEDICARE

## 2024-01-15 VITALS
TEMPERATURE: 96.8 F | HEART RATE: 84 BPM | SYSTOLIC BLOOD PRESSURE: 129 MMHG | RESPIRATION RATE: 18 BRPM | OXYGEN SATURATION: 97 % | WEIGHT: 192.02 LBS | DIASTOLIC BLOOD PRESSURE: 85 MMHG | BODY MASS INDEX: 29.1 KG/M2 | HEIGHT: 68 IN

## 2024-01-15 DIAGNOSIS — D47.2 MGUS (MONOCLONAL GAMMOPATHY OF UNKNOWN SIGNIFICANCE): Primary | ICD-10-CM

## 2024-01-15 DIAGNOSIS — M48.061 SPINAL STENOSIS OF LUMBAR REGION WITHOUT NEUROGENIC CLAUDICATION: ICD-10-CM

## 2024-01-15 LAB
BASOPHILS # BLD AUTO: 0.04 X10*3/UL (ref 0–0.1)
BASOPHILS NFR BLD AUTO: 0.8 %
EOSINOPHIL # BLD AUTO: 0.26 X10*3/UL (ref 0–0.7)
EOSINOPHIL NFR BLD AUTO: 5.5 %
ERYTHROCYTE [DISTWIDTH] IN BLOOD BY AUTOMATED COUNT: 14 % (ref 11.5–14.5)
HCT VFR BLD AUTO: 43 % (ref 41–52)
HGB BLD-MCNC: 14.3 G/DL (ref 13.5–17.5)
IMM GRANULOCYTES # BLD AUTO: 0.01 X10*3/UL (ref 0–0.7)
IMM GRANULOCYTES NFR BLD AUTO: 0.2 % (ref 0–0.9)
LYMPHOCYTES # BLD AUTO: 0.9 X10*3/UL (ref 1.2–4.8)
LYMPHOCYTES NFR BLD AUTO: 19 %
MCH RBC QN AUTO: 31.4 PG (ref 26–34)
MCHC RBC AUTO-ENTMCNC: 33.3 G/DL (ref 32–36)
MCV RBC AUTO: 95 FL (ref 80–100)
MONOCYTES # BLD AUTO: 0.4 X10*3/UL (ref 0.1–1)
MONOCYTES NFR BLD AUTO: 8.5 %
NEUTROPHILS # BLD AUTO: 3.12 X10*3/UL (ref 1.2–7.7)
NEUTROPHILS NFR BLD AUTO: 66 %
NRBC BLD-RTO: ABNORMAL /100{WBCS}
PLATELET # BLD AUTO: 178 X10*3/UL (ref 150–450)
RBC # BLD AUTO: 4.55 X10*6/UL (ref 4.5–5.9)
WBC # BLD AUTO: 4.7 X10*3/UL (ref 4.4–11.3)

## 2024-01-15 PROCEDURE — 82784 ASSAY IGA/IGD/IGG/IGM EACH: CPT

## 2024-01-15 PROCEDURE — 36415 COLL VENOUS BLD VENIPUNCTURE: CPT

## 2024-01-15 PROCEDURE — G2212 PROLONG OUTPT/OFFICE VIS: HCPCS

## 2024-01-15 PROCEDURE — 99417 PROLNG OP E/M EACH 15 MIN: CPT

## 2024-01-15 PROCEDURE — 83521 IG LIGHT CHAINS FREE EACH: CPT

## 2024-01-15 PROCEDURE — 84155 ASSAY OF PROTEIN SERUM: CPT | Mod: 59

## 2024-01-15 PROCEDURE — 1036F TOBACCO NON-USER: CPT

## 2024-01-15 PROCEDURE — 1160F RVW MEDS BY RX/DR IN RCRD: CPT

## 2024-01-15 PROCEDURE — 99215 OFFICE O/P EST HI 40 MIN: CPT

## 2024-01-15 PROCEDURE — 84075 ASSAY ALKALINE PHOSPHATASE: CPT

## 2024-01-15 PROCEDURE — 1159F MED LIST DOCD IN RCRD: CPT

## 2024-01-15 PROCEDURE — 86320 SERUM IMMUNOELECTROPHORESIS: CPT

## 2024-01-15 PROCEDURE — 84165 PROTEIN E-PHORESIS SERUM: CPT

## 2024-01-15 PROCEDURE — 86334 IMMUNOFIX E-PHORESIS SERUM: CPT

## 2024-01-15 PROCEDURE — 1126F AMNT PAIN NOTED NONE PRSNT: CPT

## 2024-01-15 PROCEDURE — 85025 COMPLETE CBC W/AUTO DIFF WBC: CPT

## 2024-01-15 ASSESSMENT — ENCOUNTER SYMPTOMS
CONSTITUTIONAL NEGATIVE: 1
EYES NEGATIVE: 1
ENDOCRINE NEGATIVE: 1
ALLERGIC/IMMUNOLOGIC NEGATIVE: 1
RESPIRATORY NEGATIVE: 1
HEMATOLOGIC/LYMPHATIC NEGATIVE: 1
NEUROLOGICAL NEGATIVE: 1
PSYCHIATRIC NEGATIVE: 1
GASTROINTESTINAL NEGATIVE: 1
MUSCULOSKELETAL NEGATIVE: 1
CARDIOVASCULAR NEGATIVE: 1

## 2024-01-15 ASSESSMENT — PAIN SCALES - GENERAL: PAINLEVEL: 0-NO PAIN

## 2024-01-15 NOTE — PROGRESS NOTES
"Patient ID: Bib Dai is a 68 y.o. male.  Referring Physician: Michelle Mcpherson, APRN-CNP  [address] [place] [address]  Primary Care Provider: Miguel Elizondo MD  Date of Service:  1/15/2024    Mr. Bib Dai is a 68 year old male. Has a past medical history of Moderate aortic regurgitation, aortic enlg, HLP, OA, PreDM, and arthritis . Presents as a referral from his PCP Dr. Elizondo for a positive SPEP found during workup for leukopenia.     Workup:  SPEP (11/11/21): 0.3g/dL IgA Kappa M protein  SFLC (12/1/21): Kappa FLC 11.13mg/dL, Lambda FLC 1.31mg/dL, FLC ratio 8.50  Immunoglobulins (11/11/21): IgA 455  24 hour urine (12/7/21): 43.1% or 45.7mg/24 hours Kappa FLC  Osseous survey (12/3/21): Negative for lytic lesions   Flow cytometery (11/14/21): Negative     PMH: Moderate aortic regurgitation, aortic enlg, HLP, OA, PreDM, arthritis     SH: Diet - reg, Denies Tobacco, Rec drug use, or Radiation exposure. Occ etoh.  by [employer]. Originally from [place].  (has allergy and has epi pen).     FH: DM, HTN; daughter with tubular sclerosis; arthritis; alzheimer's     SUBJECTIVE:  History of Present Illness:  Bib presents to clinic 1/15/24 to establish care with me for MGUS.    He is doing well overall. No acute complaints today.       Review of Systems   Constitutional: Negative.    HENT: Negative.     Eyes: Negative.    Respiratory: Negative.     Cardiovascular: Negative.    Gastrointestinal: Negative.    Endocrine: Negative.    Genitourinary: Negative.    Musculoskeletal: Negative.    Skin: Negative.    Allergic/Immunologic: Negative.    Neurological: Negative.    Hematological: Negative.    Psychiatric/Behavioral: Negative.       OBJECTIVE:  KPS: Karnofsky Score: 90 - Able to carry on normal activity; minor signs or symptoms of disease    VS:  /85   Pulse 84   Temp 36 °C (96.8 °F)   Resp 18   Ht (S) 1.727 m (5' 7.99\")   Wt 87.1 kg (192 lb 0.3 oz)   SpO2 97%   BMI 29.20 " kg/m²   BSA: 2.04 meters squared    Physical Exam  Constitutional:       Appearance: Normal appearance. He is normal weight.   HENT:      Head: Normocephalic and atraumatic.      Nose: Nose normal.      Mouth/Throat:      Mouth: Mucous membranes are moist.      Pharynx: Oropharynx is clear.   Eyes:      Extraocular Movements: Extraocular movements intact.      Conjunctiva/sclera: Conjunctivae normal.      Pupils: Pupils are equal, round, and reactive to light.   Cardiovascular:      Rate and Rhythm: Normal rate and regular rhythm.      Pulses: Normal pulses.      Heart sounds: Normal heart sounds.   Pulmonary:      Effort: Pulmonary effort is normal.      Breath sounds: Normal breath sounds.   Abdominal:      General: Abdomen is flat. Bowel sounds are normal.      Palpations: Abdomen is soft.   Musculoskeletal:         General: Normal range of motion.      Cervical back: Normal range of motion and neck supple.   Skin:     General: Skin is warm and dry.   Neurological:      General: No focal deficit present.      Mental Status: He is alert and oriented to person, place, and time. Mental status is at baseline.   Psychiatric:         Mood and Affect: Mood normal.         Behavior: Behavior normal.         Thought Content: Thought content normal.         Judgment: Judgment normal.       Laboratory:  The pertinent laboratory results were reviewed and discussed with the patient.    Lab Results   Component Value Date    WBC 4.7 11/15/2023    HCT 42.1 11/15/2023    HGB 13.8 11/15/2023     11/15/2023    K 4.6 11/15/2023    CALCIUM 9.4 11/15/2023     11/15/2023    ALT 14 09/12/2023    AST 15 09/12/2023    BUN 16 11/15/2023    CREATININE 0.91 11/15/2023    KAPPA 17.59 (H) 11/15/2023    LAMBDA 1.10 11/15/2023    KAPLS 15.99 (H) 11/15/2023    SPEP Aberrant band detected. See immunofixation. 11/15/2023    IEPIN  11/15/2023     Known monoclonal IgA kappa in the beta region at 0.3 g/dL. Unchanged from the previous  analysis on 11/1/22.     11/15/2023    IGM 53 11/15/2023     (H) 11/15/2023      Note: for a comprehensive list of the patient's lab results, access the Results Review activity.    ASSESSMENT and PLAN:    MGUS:  - Former Michelle Mcpherson CNP patient  - 0.3g/dL IgA Kappa M protein  - Initially Kappa FLC 11.13mg/dL, FLC ratio 8.50  - IgA 455  - 24 hour UPEP showed 43.1% or 45.7mg/24 hours Kappa FLC   - Negative osseous survey  - Increase in FLC ratio over past couple years (currently 15.99 in 11/2023)  - Repeat labs, 24 hour urine  - No overt SLiM CRAB criteria  - Based on results may proceed with BMBx    RTC:  1 week to discuss lab results     TALON Monet-CNP

## 2024-01-16 LAB
ALBUMIN SERPL BCP-MCNC: 4.4 G/DL (ref 3.4–5)
ALP SERPL-CCNC: 52 U/L (ref 33–136)
ALT SERPL W P-5'-P-CCNC: 19 U/L (ref 10–52)
ANION GAP SERPL CALC-SCNC: 13 MMOL/L (ref 10–20)
AST SERPL W P-5'-P-CCNC: 15 U/L (ref 9–39)
BILIRUB SERPL-MCNC: 0.6 MG/DL (ref 0–1.2)
BUN SERPL-MCNC: 19 MG/DL (ref 6–23)
CALCIUM SERPL-MCNC: 9.4 MG/DL (ref 8.6–10.6)
CHLORIDE SERPL-SCNC: 104 MMOL/L (ref 98–107)
CO2 SERPL-SCNC: 28 MMOL/L (ref 21–32)
CREAT SERPL-MCNC: 0.88 MG/DL (ref 0.5–1.3)
EGFRCR SERPLBLD CKD-EPI 2021: >90 ML/MIN/1.73M*2
GLUCOSE SERPL-MCNC: 82 MG/DL (ref 74–99)
IGA SERPL-MCNC: 501 MG/DL (ref 70–400)
IGG SERPL-MCNC: 866 MG/DL (ref 700–1600)
IGM SERPL-MCNC: 61 MG/DL (ref 40–230)
KAPPA LC SERPL-MCNC: 15.7 MG/DL (ref 0.33–1.94)
KAPPA LC/LAMBDA SER: 13.08 {RATIO} (ref 0.26–1.65)
LAMBDA LC SERPL-MCNC: 1.2 MG/DL (ref 0.57–2.63)
POTASSIUM SERPL-SCNC: 4.8 MMOL/L (ref 3.5–5.3)
PROT SERPL-MCNC: 6.9 G/DL (ref 6.4–8.2)
PROT SERPL-MCNC: 6.9 G/DL (ref 6.4–8.2)
SODIUM SERPL-SCNC: 140 MMOL/L (ref 136–145)

## 2024-01-17 ENCOUNTER — LAB (OUTPATIENT)
Dept: LAB | Facility: CLINIC | Age: 69
End: 2024-01-17
Payer: MEDICARE

## 2024-01-17 DIAGNOSIS — D47.2 MGUS (MONOCLONAL GAMMOPATHY OF UNKNOWN SIGNIFICANCE): Primary | ICD-10-CM

## 2024-01-17 DIAGNOSIS — D47.2 MGUS (MONOCLONAL GAMMOPATHY OF UNKNOWN SIGNIFICANCE): ICD-10-CM

## 2024-01-17 LAB
ALBUMIN: 4.3 G/DL (ref 3.4–5)
ALPHA 1 GLOBULIN: 0.2 G/DL (ref 0.2–0.6)
ALPHA 2 GLOBULIN: 0.6 G/DL (ref 0.4–1.1)
BETA GLOBULIN: 1 G/DL (ref 0.5–1.2)
GAMMA GLOBULIN: 0.8 G/DL (ref 0.5–1.4)
IMMUNOFIXATION COMMENT: ABNORMAL
M-PROTEIN 1: 0.3 G/DL
PATH REVIEW - SERUM IMMUNOFIXATION: ABNORMAL
PATH REVIEW-SERUM PROTEIN ELECTROPHORESIS: ABNORMAL
PROTEIN ELECTROPHORESIS COMMENT: ABNORMAL

## 2024-01-17 PROCEDURE — 86325 OTHER IMMUNOELECTROPHORESIS: CPT | Performed by: STUDENT IN AN ORGANIZED HEALTH CARE EDUCATION/TRAINING PROGRAM

## 2024-01-17 PROCEDURE — 84166 PROTEIN E-PHORESIS/URINE/CSF: CPT | Performed by: STUDENT IN AN ORGANIZED HEALTH CARE EDUCATION/TRAINING PROGRAM

## 2024-01-17 PROCEDURE — 86335 IMMUNFIX E-PHORSIS/URINE/CSF: CPT

## 2024-01-17 PROCEDURE — 84166 PROTEIN E-PHORESIS/URINE/CSF: CPT

## 2024-01-17 PROCEDURE — 81050 URINALYSIS VOLUME MEASURE: CPT

## 2024-01-17 PROCEDURE — 84156 ASSAY OF PROTEIN URINE: CPT

## 2024-01-18 LAB
COLLECT DURATION TIME SPEC: 24 HRS
PROT 24H UR-MCNC: 10 MG/DL (ref 5–25)
SPECIMEN VOL 24H UR: 2037 ML
TOTAL PROTEIN (MG/24HR) IN 24 HOUR URINE UPE: 204 MG/24H

## 2024-01-22 ENCOUNTER — TELEPHONE (OUTPATIENT)
Dept: HEMATOLOGY/ONCOLOGY | Facility: CLINIC | Age: 69
End: 2024-01-22
Payer: MEDICARE

## 2024-01-22 LAB
ALBUMIN MFR UR ELPH: 8.7 %
ALPHA1 GLOB MFR UR ELPH: 2.9 %
ALPHA2 GLOB MFR UR ELPH: 4.7 %
B-GLOBULIN MFR UR ELPH: 77.9 %
GAMMA GLOB MFR UR ELPH: 5.8 %
IMMUNOFIXATION COMMENT: ABNORMAL
M-PROTEIN 1 URINE %: 58.8 %
M-PROTEIN 1 URINE PER 24HR: 120 MG/24HR
PATH REVIEW - URINE IMMUNOFIXATION: ABNORMAL
PATH REVIEW-URINE PROTEIN ELECTROPHORESIS: ABNORMAL
URINE ELECTROPHORESIS COMMENT: ABNORMAL

## 2024-01-23 DIAGNOSIS — D47.2 MGUS (MONOCLONAL GAMMOPATHY OF UNKNOWN SIGNIFICANCE): Primary | ICD-10-CM

## 2024-02-26 DIAGNOSIS — D47.2 MGUS (MONOCLONAL GAMMOPATHY OF UNKNOWN SIGNIFICANCE): Primary | ICD-10-CM

## 2024-03-18 ENCOUNTER — APPOINTMENT (OUTPATIENT)
Dept: HEMATOLOGY/ONCOLOGY | Facility: CLINIC | Age: 69
End: 2024-03-18
Payer: MEDICARE

## 2024-04-22 ENCOUNTER — APPOINTMENT (OUTPATIENT)
Dept: HEMATOLOGY/ONCOLOGY | Facility: CLINIC | Age: 69
End: 2024-04-22
Payer: MEDICARE

## 2024-04-29 ENCOUNTER — OFFICE VISIT (OUTPATIENT)
Dept: HEMATOLOGY/ONCOLOGY | Facility: CLINIC | Age: 69
End: 2024-04-29
Payer: MEDICARE

## 2024-04-29 VITALS
RESPIRATION RATE: 16 BRPM | TEMPERATURE: 97.3 F | DIASTOLIC BLOOD PRESSURE: 82 MMHG | OXYGEN SATURATION: 98 % | WEIGHT: 192.35 LBS | BODY MASS INDEX: 29.25 KG/M2 | HEART RATE: 71 BPM | SYSTOLIC BLOOD PRESSURE: 127 MMHG

## 2024-04-29 DIAGNOSIS — D47.2 MGUS (MONOCLONAL GAMMOPATHY OF UNKNOWN SIGNIFICANCE): Primary | ICD-10-CM

## 2024-04-29 PROCEDURE — 88189 FLOWCYTOMETRY/READ 16 & >: CPT | Performed by: PATHOLOGY

## 2024-04-29 PROCEDURE — 88341 IMHCHEM/IMCYTCHM EA ADD ANTB: CPT | Performed by: PATHOLOGY

## 2024-04-29 PROCEDURE — 88365 INSITU HYBRIDIZATION (FISH): CPT | Mod: TC,91,SUR

## 2024-04-29 PROCEDURE — 88311 DECALCIFY TISSUE: CPT | Performed by: PATHOLOGY

## 2024-04-29 PROCEDURE — 88313 SPECIAL STAINS GROUP 2: CPT | Performed by: PATHOLOGY

## 2024-04-29 PROCEDURE — 88237 TISSUE CULTURE BONE MARROW: CPT

## 2024-04-29 PROCEDURE — 38222 DX BONE MARROW BX & ASPIR: CPT

## 2024-04-29 PROCEDURE — 99215 OFFICE O/P EST HI 40 MIN: CPT

## 2024-04-29 PROCEDURE — 88342 IMHCHEM/IMCYTCHM 1ST ANTB: CPT | Performed by: PATHOLOGY

## 2024-04-29 PROCEDURE — 88305 TISSUE EXAM BY PATHOLOGIST: CPT | Performed by: PATHOLOGY

## 2024-04-29 PROCEDURE — 88185 FLOWCYTOMETRY/TC ADD-ON: CPT | Mod: TC,91

## 2024-04-29 PROCEDURE — 88291 CYTO/MOLECULAR REPORT: CPT | Performed by: PATHOLOGY

## 2024-04-29 PROCEDURE — 88365 INSITU HYBRIDIZATION (FISH): CPT | Performed by: PATHOLOGY

## 2024-04-29 PROCEDURE — 1160F RVW MEDS BY RX/DR IN RCRD: CPT

## 2024-04-29 PROCEDURE — 85097 BONE MARROW INTERPRETATION: CPT | Mod: TC

## 2024-04-29 PROCEDURE — 1159F MED LIST DOCD IN RCRD: CPT

## 2024-04-29 PROCEDURE — 88271 CYTOGENETICS DNA PROBE: CPT

## 2024-04-29 PROCEDURE — 1126F AMNT PAIN NOTED NONE PRSNT: CPT

## 2024-04-29 ASSESSMENT — PAIN SCALES - GENERAL: PAINLEVEL: 0-NO PAIN

## 2024-04-29 ASSESSMENT — ENCOUNTER SYMPTOMS
GASTROINTESTINAL NEGATIVE: 1
EYES NEGATIVE: 1
CONSTITUTIONAL NEGATIVE: 1
PSYCHIATRIC NEGATIVE: 1
MUSCULOSKELETAL NEGATIVE: 1
RESPIRATORY NEGATIVE: 1
ENDOCRINE NEGATIVE: 1
CARDIOVASCULAR NEGATIVE: 1
HEMATOLOGIC/LYMPHATIC NEGATIVE: 1
ALLERGIC/IMMUNOLOGIC NEGATIVE: 1
NEUROLOGICAL NEGATIVE: 1

## 2024-04-29 NOTE — PROGRESS NOTES
Patient ID: Bib Dai is a 69 y.o. male.  Referring Physician: Mj Calloway, APRN-CNP  [address] [place] [address]  Primary Care Provider: Miguel Elizondo MD  Date of Service:  4/29/2024    IgA Kappa MGUS:    Mr. Bib Dai is a 68 year old male. Has a past medical history of Moderate aortic regurgitation, aortic enlg, HLP, OA, PreDM, and arthritis . Presents as a referral from his PCP Dr. Elizondo for a positive SPEP found during workup for leukopenia.     Workup:  SPEP (11/11/21): 0.3g/dL IgA Kappa M protein  SFLC (12/1/21): Kappa FLC 11.13mg/dL, Lambda FLC 1.31mg/dL, FLC ratio 8.50  Immunoglobulins (11/11/21): IgA 455  24 hour urine (12/7/21): 43.1% or 45.7mg/24 hours Kappa FLC  Osseous survey (12/3/21): Negative for lytic lesions   Flow cytometery (11/14/21): Negative     PMH: Moderate aortic regurgitation, aortic enlg, HLP, OA, PreDM, arthritis     SH: Diet - reg, Denies Tobacco, Rec drug use, or Radiation exposure. Occ etoh.  by [employer]. Originally from [place].  (has allergy and has epi pen).     FH: DM, HTN; daughter with tubular sclerosis; arthritis; alzheimer's     SUBJECTIVE:  History of Present Illness:  Bib presents to clinic 4/29/24 for BMBx and follow up visit.    Overall he is feeling well.    His only complaint since his last visit is a weird feeling with urination. He is worried this is related to his prostate.         Review of Systems   Constitutional: Negative.    HENT: Negative.     Eyes: Negative.    Respiratory: Negative.     Cardiovascular: Negative.    Gastrointestinal: Negative.    Endocrine: Negative.    Genitourinary: Negative.    Musculoskeletal: Negative.    Skin: Negative.    Allergic/Immunologic: Negative.    Neurological: Negative.    Hematological: Negative.    Psychiatric/Behavioral: Negative.       OBJECTIVE:  KPS: Karnofsky Score: 90 - Able to carry on normal activity; minor signs or symptoms of disease    VS:  /82   Pulse 71    Temp 36.3 °C (97.3 °F)   Resp 16   Wt 87.2 kg (192 lb 5.6 oz)   SpO2 98%   BMI 29.25 kg/m²   BSA: 2.05 meters squared    Physical Exam  Constitutional:       Appearance: Normal appearance. He is normal weight.   HENT:      Head: Normocephalic and atraumatic.      Nose: Nose normal.      Mouth/Throat:      Mouth: Mucous membranes are moist.      Pharynx: Oropharynx is clear.   Eyes:      Extraocular Movements: Extraocular movements intact.      Conjunctiva/sclera: Conjunctivae normal.      Pupils: Pupils are equal, round, and reactive to light.   Cardiovascular:      Rate and Rhythm: Normal rate and regular rhythm.      Pulses: Normal pulses.      Heart sounds: Normal heart sounds.   Pulmonary:      Effort: Pulmonary effort is normal.      Breath sounds: Normal breath sounds.   Abdominal:      General: Abdomen is flat. Bowel sounds are normal.      Palpations: Abdomen is soft.   Musculoskeletal:         General: Normal range of motion.      Cervical back: Normal range of motion and neck supple.   Skin:     General: Skin is warm and dry.   Neurological:      General: No focal deficit present.      Mental Status: He is alert and oriented to person, place, and time. Mental status is at baseline.   Psychiatric:         Mood and Affect: Mood normal.         Behavior: Behavior normal.         Thought Content: Thought content normal.         Judgment: Judgment normal.       Laboratory:  The pertinent laboratory results were reviewed and discussed with the patient.    Lab Results   Component Value Date    WBC 4.7 04/29/2024    HCT 43.1 04/29/2024    HGB 14.4 04/29/2024     04/29/2024    K 4.8 01/15/2024    CALCIUM 9.4 01/15/2024     01/15/2024    ALT 19 01/15/2024    AST 15 01/15/2024    BUN 19 01/15/2024    CREATININE 0.88 01/15/2024    KAPPA 15.70 (H) 01/15/2024    LAMBDA 1.20 01/15/2024    KAPLS 13.08 (H) 01/15/2024    SPEP Aberrant band detected. See immunofixation. 01/15/2024    IEPIN  01/17/2024      1/17/24 Known monoclonal free kappa light chains at 120 mg/24 hours. Last detected on 12/7/21 at 46 mg/24 hours.     01/15/2024    IGM 61 01/15/2024     (H) 01/15/2024      Note: for a comprehensive list of the patient's lab results, access the Results Review activity.    ASSESSMENT and PLAN:    MGUS:  - Former Michelle Mcpherson CNP patient  - 0.3g/dL IgA Kappa M protein  - Initially Kappa FLC 11.13mg/dL, FLC ratio 8.50  - IgA 455  - 24 hour UPEP showed 43.1% or 45.7mg/24 hours Kappa FLC   - Negative osseous survey  - Increase in FLC ratio over past couple years (currently 15.99 in 11/2023)  - 24 hour urine showed 120mg/24 hour Kappa FLC, 58.8% of total urine protein  - No overt SLiM CRAB criteria  - BMBx performed today 4/29/24    RTC:  1 week to discuss lab results     TALON Monet-CNP

## 2024-04-29 NOTE — PROGRESS NOTES
Patient ID: Bib Dai is a 69 y.o. male.    Biopsy bone marrow    Date/Time: 4/29/2024 8:44 AM    Performed by: SOY Monet  Authorized by: SOY Monet    Consent:     Consent obtained:  Verbal and written    Consent given by:  Patient    Risks, benefits, and alternatives were discussed: yes      Risks discussed:  Bleeding, infection, pain and nerve damage  Universal protocol:     Procedure explained and questions answered to patient or proxy's satisfaction: yes      Relevant documents present and verified: yes      Test results available: yes      Imaging studies available: yes      Required blood products, implants, devices, and special equipment available: yes      Site/side marked: yes      Immediately prior to procedure, a time out was called: yes      Patient identity confirmed:  Verbally with patient, arm band, provided demographic data and hospital-assigned identification number  Indications:     Indications:  MGUS  Pre-procedure details:     Skin preparation:  Chlorhexidine  Anesthesia:     Anesthesia method:  Local infiltration    Local anesthetic:  Lidocaine 2% w/o epi  Procedure specific details:      Bone Marrow Biopsy and Aspiration Procedure Note     Informed consent was obtained and potential risks including bleeding, infection and pain were reviewed with the patient.     The left posterior iliac crest was prepped with chlorhexidine.     10 ml of lidocaine 2% local anesthesia infiltrated into the subcutaneous tissue.    Leftt bone marrow biopsy and left bone marrow aspirate was obtained.     The procedure was tolerated well and there were no complications.    Specimens sent for: routine histopathologic stains and sectioning, flow cytometry, cytogenetics, and molecular analysis    Procedure completed by: SOY Monet     Post-procedure details:     Procedure completion:  Tolerated well, no immediate complications

## 2024-05-02 LAB
CELL COUNT (BLOOD): 4.47 X10*3/UL
CELL POPULATIONS: NORMAL
DIAGNOSIS: NORMAL
FLOW DIFFERENTIAL: NORMAL
FLOW TEST ORDERED: NORMAL
LAB TEST METHOD: NORMAL
NUMBER OF CELLS COLLECTED: NORMAL PER TUBE
PATH REPORT.TOTAL CANCER: NORMAL
SIGNATURE COMMENT: NORMAL
SPECIMEN VIABILITY: NORMAL

## 2024-05-03 LAB
PATH REPORT.COMMENTS IMP SPEC: NORMAL
PATH REPORT.FINAL DX SPEC: NORMAL
PATH REPORT.GROSS SPEC: NORMAL
PATH REPORT.MICROSCOPIC SPEC OTHER STN: NORMAL
PATH REPORT.RELEVANT HX SPEC: NORMAL
PATH REPORT.TOTAL CANCER: NORMAL

## 2024-05-06 ENCOUNTER — TELEMEDICINE (OUTPATIENT)
Dept: HEMATOLOGY/ONCOLOGY | Facility: CLINIC | Age: 69
End: 2024-05-06
Payer: MEDICARE

## 2024-05-06 DIAGNOSIS — D47.2 MGUS (MONOCLONAL GAMMOPATHY OF UNKNOWN SIGNIFICANCE): Primary | ICD-10-CM

## 2024-05-06 PROCEDURE — 1159F MED LIST DOCD IN RCRD: CPT

## 2024-05-06 PROCEDURE — 99443 PR PHYS/QHP TELEPHONE EVALUATION 21-30 MIN: CPT

## 2024-05-06 PROCEDURE — 1160F RVW MEDS BY RX/DR IN RCRD: CPT

## 2024-05-06 ASSESSMENT — ENCOUNTER SYMPTOMS
MUSCULOSKELETAL NEGATIVE: 1
ALLERGIC/IMMUNOLOGIC NEGATIVE: 1
EYES NEGATIVE: 1
GASTROINTESTINAL NEGATIVE: 1
HEMATOLOGIC/LYMPHATIC NEGATIVE: 1
PSYCHIATRIC NEGATIVE: 1
RESPIRATORY NEGATIVE: 1
CARDIOVASCULAR NEGATIVE: 1
ENDOCRINE NEGATIVE: 1
CONSTITUTIONAL NEGATIVE: 1
NEUROLOGICAL NEGATIVE: 1

## 2024-05-06 NOTE — PROGRESS NOTES
Patient ID: Bib Dai is a 69 y.o. male.  Referring Physician: No referring provider defined for this encounter.  Primary Care Provider: Miguel Elizondo MD  Date of Service:  5/6/2024    IgA Kappa MGUS:    Mr. Bib Dai is a 68 year old male. Has a past medical history of Moderate aortic regurgitation, aortic enlg, HLP, OA, PreDM, and arthritis . Presents as a referral from his PCP Dr. Elizondo for a positive SPEP found during workup for leukopenia.     Workup:  SPEP (11/11/21): 0.3g/dL IgA Kappa M protein  SFLC (12/1/21): Kappa FLC 11.13mg/dL, Lambda FLC 1.31mg/dL, FLC ratio 8.50  Immunoglobulins (11/11/21): IgA 455  24 hour urine (12/7/21): 43.1% or 45.7mg/24 hours Kappa FLC  Osseous survey (12/3/21): Negative for lytic lesions   Flow cytometery (11/14/21): Negative   BMBx (4/29/24):  A, B & C. BONE MARROW CLOT WITH ASPIRATE AND CORE WITH TOUCH PREP, LEFT ILIAC CREST:   --  NORMOCELLULAR BONE MARROW (30%) WITH MATURING TRILINEAGE HEMATOPOIESIS.  --  APPROXIMATELY 6-8% KAPPA-RESTRICTED PLASMA CELLS CONSISTENT WITH PLASMA CELL NEOPLASM, SEE NOTE.     NOTE: A population of clonal plasma cells (CD19-, CD56+/bright, CD27+/moderate, CD45-/dim, kappa-restricted), was detected by flow cytometry. There was morphological evidence of these cells, which were enumerated at 5.5% in the aspirate smear. 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 Clinical and radiological correlation is recommended.      PMH: Moderate aortic regurgitation, aortic enlg, HLP, OA, PreDM, arthritis     SH: Diet - reg, Denies Tobacco, Rec drug use, or Radiation exposure. Occ etoh.  by [employer]. Originally from [place].  (has allergy and has epi pen).     FH: DM, HTN; daughter with tubular sclerosis; arthritis; alzheimer's     SUBJECTIVE:  History of Present Illness:  Bib presents to clinic 5/6/24 for a follow up phone call.    Overall he is feeling well.    Notes he had  some soreness at his biopsy site, however it has resolved.     Planning to follow up with PCP regarding PSA.      Review of Systems   Constitutional: Negative.    HENT: Negative.     Eyes: Negative.    Respiratory: Negative.     Cardiovascular: Negative.    Gastrointestinal: Negative.    Endocrine: Negative.    Genitourinary: Negative.    Musculoskeletal: Negative.    Skin: Negative.    Allergic/Immunologic: Negative.    Neurological: Negative.    Hematological: Negative.    Psychiatric/Behavioral: Negative.       OBJECTIVE:  KPS: Karnofsky Score: 90 - Able to carry on normal activity; minor signs or symptoms of disease    VS:  There were no vitals taken for this visit.  BSA: There is no height or weight on file to calculate BSA.    Laboratory:  The pertinent laboratory results were reviewed and discussed with the patient.    Lab Results   Component Value Date    WBC 4.7 04/29/2024    HCT 43.1 04/29/2024    HGB 14.4 04/29/2024     04/29/2024    K 4.5 04/29/2024    CALCIUM 9.3 04/29/2024     04/29/2024    ALT 15 04/29/2024    AST 15 04/29/2024    BUN 16 04/29/2024    CREATININE 0.96 04/29/2024    KAPPA 16.26 (H) 04/29/2024    LAMBDA 1.11 04/29/2024    KAPLS 14.65 (H) 04/29/2024    SPEP Aberrant band detected. See immunofixation. 04/29/2024    IEPIN  04/29/2024 4/29/24  Known monoclonal IgA kappa in the beta region at 0.3 g/dL. Unchanged from the previous analysis on 1/15/24.     04/29/2024    IGM 50 04/29/2024     (H) 04/29/2024      Note: for a comprehensive list of the patient's lab results, access the Results Review activity.    ASSESSMENT and PLAN:    MGUS:  - Former Michelle Mcpherson, CNP patient  - 0.3g/dL IgA Kappa M protein  - Initially Kappa FLC 11.13mg/dL, FLC ratio 8.50  - IgA 455  - 24 hour UPEP showed 43.1% or 45.7mg/24 hours Kappa FLC   - Negative osseous survey  - Increase in FLC ratio over past couple years (currently 15.99 in 11/2023)  - 24 hour urine showed 120mg/24 hour  Kappa FLC, 58.8% of total urine protein  - No overt SLiM CRAB criteria  - BMBx performed today 4/29/24, showed 6-8% plasma cells, consistent with MGUS    RTC:  6 months virtual w/ labs prior     Mj Calloway, APRN-CNP

## 2024-05-07 ENCOUNTER — LAB (OUTPATIENT)
Dept: LAB | Facility: LAB | Age: 69
End: 2024-05-07
Payer: MEDICARE

## 2024-05-07 ENCOUNTER — OFFICE VISIT (OUTPATIENT)
Dept: PRIMARY CARE | Facility: CLINIC | Age: 69
End: 2024-05-07
Payer: MEDICARE

## 2024-05-07 VITALS
DIASTOLIC BLOOD PRESSURE: 78 MMHG | BODY MASS INDEX: 30.45 KG/M2 | OXYGEN SATURATION: 100 % | HEART RATE: 69 BPM | HEIGHT: 67 IN | SYSTOLIC BLOOD PRESSURE: 130 MMHG | WEIGHT: 194 LBS

## 2024-05-07 DIAGNOSIS — R39.9 URINARY SYMPTOM OR SIGN: ICD-10-CM

## 2024-05-07 DIAGNOSIS — Z00.00 ROUTINE GENERAL MEDICAL EXAMINATION AT HEALTH CARE FACILITY: Primary | ICD-10-CM

## 2024-05-07 DIAGNOSIS — R35.1 BENIGN PROSTATIC HYPERPLASIA WITH NOCTURIA: ICD-10-CM

## 2024-05-07 DIAGNOSIS — N40.1 BENIGN PROSTATIC HYPERPLASIA WITH NOCTURIA: ICD-10-CM

## 2024-05-07 DIAGNOSIS — I77.89 ASCENDING AORTA ENLARGEMENT (CMS-HCC): ICD-10-CM

## 2024-05-07 PROCEDURE — 1170F FXNL STATUS ASSESSED: CPT | Performed by: INTERNAL MEDICINE

## 2024-05-07 PROCEDURE — 87086 URINE CULTURE/COLONY COUNT: CPT

## 2024-05-07 PROCEDURE — 81003 URINALYSIS AUTO W/O SCOPE: CPT

## 2024-05-07 PROCEDURE — G0439 PPPS, SUBSEQ VISIT: HCPCS | Performed by: INTERNAL MEDICINE

## 2024-05-07 PROCEDURE — 99213 OFFICE O/P EST LOW 20 MIN: CPT | Performed by: INTERNAL MEDICINE

## 2024-05-07 PROCEDURE — 1159F MED LIST DOCD IN RCRD: CPT | Performed by: INTERNAL MEDICINE

## 2024-05-07 PROCEDURE — 1160F RVW MEDS BY RX/DR IN RCRD: CPT | Performed by: INTERNAL MEDICINE

## 2024-05-07 PROCEDURE — 1036F TOBACCO NON-USER: CPT | Performed by: INTERNAL MEDICINE

## 2024-05-07 ASSESSMENT — ENCOUNTER SYMPTOMS
FREQUENCY: 1
FATIGUE: 0
FLANK PAIN: 0
DIFFICULTY URINATING: 0
SHORTNESS OF BREATH: 0
DYSURIA: 0
HEMATURIA: 0

## 2024-05-07 ASSESSMENT — ACTIVITIES OF DAILY LIVING (ADL)
TAKING_MEDICATION: INDEPENDENT
MANAGING_FINANCES: INDEPENDENT
DRESSING: INDEPENDENT
GROCERY_SHOPPING: INDEPENDENT
BATHING: INDEPENDENT
DOING_HOUSEWORK: INDEPENDENT

## 2024-05-07 ASSESSMENT — PATIENT HEALTH QUESTIONNAIRE - PHQ9
SUM OF ALL RESPONSES TO PHQ9 QUESTIONS 1 AND 2: 0
1. LITTLE INTEREST OR PLEASURE IN DOING THINGS: NOT AT ALL
2. FEELING DOWN, DEPRESSED OR HOPELESS: NOT AT ALL

## 2024-05-07 NOTE — ASSESSMENT & PLAN NOTE
Check UA and culture.  If negative then rec tamsulosin 0.4 mg daily.  Consider urology consult if no improvement.

## 2024-05-08 LAB
APPEARANCE UR: CLEAR
BACTERIA UR CULT: NORMAL
BILIRUB UR STRIP.AUTO-MCNC: NEGATIVE MG/DL
COLOR UR: NORMAL
GLUCOSE UR STRIP.AUTO-MCNC: NORMAL MG/DL
KETONES UR STRIP.AUTO-MCNC: NEGATIVE MG/DL
LEUKOCYTE ESTERASE UR QL STRIP.AUTO: NEGATIVE
NITRITE UR QL STRIP.AUTO: NEGATIVE
PH UR STRIP.AUTO: 7 [PH]
PROT UR STRIP.AUTO-MCNC: NEGATIVE MG/DL
RBC # UR STRIP.AUTO: NEGATIVE /UL
SP GR UR STRIP.AUTO: 1.01
UROBILINOGEN UR STRIP.AUTO-MCNC: NORMAL MG/DL

## 2024-05-09 LAB
CHROM ANALY OVERALL INTERP-IMP: NORMAL
ELECTRONICALLY SIGNED BY CYTOGENETICS: NORMAL

## 2024-05-10 DIAGNOSIS — N40.1 BENIGN PROSTATIC HYPERPLASIA WITH NOCTURIA: Primary | ICD-10-CM

## 2024-05-10 DIAGNOSIS — R35.1 BENIGN PROSTATIC HYPERPLASIA WITH NOCTURIA: Primary | ICD-10-CM

## 2024-05-10 RX ORDER — TAMSULOSIN HYDROCHLORIDE 0.4 MG/1
0.4 CAPSULE ORAL DAILY
Qty: 30 CAPSULE | Refills: 3 | Status: SHIPPED | OUTPATIENT
Start: 2024-05-10

## 2024-05-14 LAB
CHROM ANALY OVERALL INTERP-IMP: NORMAL
ELECTRONICALLY COSIGNED BY CYTOGENETICS: NORMAL
ELECTRONICALLY SIGNED BY CYTOGENETICS: NORMAL
FISH ISCN RESULTS: NORMAL

## 2024-05-28 ENCOUNTER — TELEPHONE (OUTPATIENT)
Dept: ADMISSION | Facility: HOSPITAL | Age: 69
End: 2024-05-28
Payer: MEDICARE

## 2024-05-28 NOTE — TELEPHONE ENCOUNTER
Patient requesting phone appt with Darryl Calloway; he did state nothing of urgency, however, he has several questions to ask him. Appt made for tomorrow afternoon. No further questions or concerns at this time.

## 2024-05-29 ENCOUNTER — TELEMEDICINE (OUTPATIENT)
Dept: HEMATOLOGY/ONCOLOGY | Facility: HOSPITAL | Age: 69
End: 2024-05-29
Payer: MEDICARE

## 2024-05-29 ENCOUNTER — HOSPITAL ENCOUNTER (OUTPATIENT)
Dept: RADIOLOGY | Facility: CLINIC | Age: 69
Discharge: HOME | End: 2024-05-29
Payer: MEDICARE

## 2024-05-29 DIAGNOSIS — M48.061 SPINAL STENOSIS OF LUMBAR REGION WITHOUT NEUROGENIC CLAUDICATION: ICD-10-CM

## 2024-05-29 DIAGNOSIS — D47.2 MGUS (MONOCLONAL GAMMOPATHY OF UNKNOWN SIGNIFICANCE): Primary | ICD-10-CM

## 2024-05-29 DIAGNOSIS — D47.2 MGUS (MONOCLONAL GAMMOPATHY OF UNKNOWN SIGNIFICANCE): ICD-10-CM

## 2024-05-29 PROCEDURE — 73502 X-RAY EXAM HIP UNI 2-3 VIEWS: CPT | Mod: LEFT SIDE | Performed by: RADIOLOGY

## 2024-05-29 PROCEDURE — 1160F RVW MEDS BY RX/DR IN RCRD: CPT

## 2024-05-29 PROCEDURE — 99443 PR PHYS/QHP TELEPHONE EVALUATION 21-30 MIN: CPT

## 2024-05-29 PROCEDURE — 73502 X-RAY EXAM HIP UNI 2-3 VIEWS: CPT | Mod: LT

## 2024-05-29 PROCEDURE — 1159F MED LIST DOCD IN RCRD: CPT

## 2024-05-29 ASSESSMENT — ENCOUNTER SYMPTOMS
MYALGIAS: 1
ENDOCRINE NEGATIVE: 1
HEMATOLOGIC/LYMPHATIC NEGATIVE: 1
CONSTITUTIONAL NEGATIVE: 1
CARDIOVASCULAR NEGATIVE: 1
NEUROLOGICAL NEGATIVE: 1
RESPIRATORY NEGATIVE: 1
PSYCHIATRIC NEGATIVE: 1
EYES NEGATIVE: 1
ALLERGIC/IMMUNOLOGIC NEGATIVE: 1
GASTROINTESTINAL NEGATIVE: 1

## 2024-05-29 NOTE — PROGRESS NOTES
Patient ID: Bib Dai is a 69 y.o. male.  Referring Physician: No referring provider defined for this encounter.  Primary Care Provider: Miguel Elizondo MD  Date of Service:  5/29/2024    IgA Kappa MGUS:    Mr. Bib Dai is a 68 year old male. Has a past medical history of Moderate aortic regurgitation, aortic enlg, HLP, OA, PreDM, and arthritis . Presents as a referral from his PCP Dr. Elizondo for a positive SPEP found during workup for leukopenia.     Workup:  SPEP (11/11/21): 0.3g/dL IgA Kappa M protein  SFLC (12/1/21): Kappa FLC 11.13mg/dL, Lambda FLC 1.31mg/dL, FLC ratio 8.50  Immunoglobulins (11/11/21): IgA 455  24 hour urine (12/7/21): 43.1% or 45.7mg/24 hours Kappa FLC  Osseous survey (12/3/21): Negative for lytic lesions   Flow cytometery (11/14/21): Negative   BMBx (4/29/24):  A, B & C. BONE MARROW CLOT WITH ASPIRATE AND CORE WITH TOUCH PREP, LEFT ILIAC CREST:   --  NORMOCELLULAR BONE MARROW (30%) WITH MATURING TRILINEAGE HEMATOPOIESIS.  --  APPROXIMATELY 6-8% KAPPA-RESTRICTED PLASMA CELLS CONSISTENT WITH PLASMA CELL NEOPLASM, SEE NOTE.     NOTE: A population of clonal plasma cells (CD19-, CD56+/bright, CD27+/moderate, CD45-/dim, kappa-restricted), was detected by flow cytometry. There was morphological evidence of these cells, which were enumerated at 5.5% in the aspirate smear. 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 Clinical and radiological correlation is recommended.      PMH: Moderate aortic regurgitation, aortic enlg, HLP, OA, PreDM, arthritis     SH: Diet - reg, Denies Tobacco, Rec drug use, or Radiation exposure. Occ etoh.  by [employer]. Originally from [place].  (has allergy and has epi pen).     FH: DM, HTN; daughter with tubular sclerosis; arthritis; alzheimer's     SUBJECTIVE:  History of Present Illness:  Bib presents to clinic 5/29/24 for a follow up phone call.    Notes that since his bone marrow he has had  on and off L hip pain. He notes it cramps up. Unsure if pain is muscular or neuropathic in nature. Has not been acting up over the past few weeks but seems to come and go depending on how active he is.       Review of Systems   Constitutional: Negative.    HENT: Negative.     Eyes: Negative.    Respiratory: Negative.     Cardiovascular: Negative.    Gastrointestinal: Negative.    Endocrine: Negative.    Genitourinary: Negative.    Musculoskeletal:  Positive for myalgias.   Skin: Negative.    Allergic/Immunologic: Negative.    Neurological: Negative.    Hematological: Negative.    Psychiatric/Behavioral: Negative.       OBJECTIVE:  KPS: Karnofsky Score: 90 - Able to carry on normal activity; minor signs or symptoms of disease    VS:  There were no vitals taken for this visit.  BSA: There is no height or weight on file to calculate BSA.    Laboratory:  The pertinent laboratory results were reviewed and discussed with the patient.    Lab Results   Component Value Date    WBC 4.7 04/29/2024    HCT 43.1 04/29/2024    HGB 14.4 04/29/2024     04/29/2024    K 4.5 04/29/2024    CALCIUM 9.3 04/29/2024     04/29/2024    ALT 15 04/29/2024    AST 15 04/29/2024    BUN 16 04/29/2024    CREATININE 0.96 04/29/2024    KAPPA 16.26 (H) 04/29/2024    LAMBDA 1.11 04/29/2024    KAPLS 14.65 (H) 04/29/2024    SPEP Aberrant band detected. See immunofixation. 04/29/2024    IEPIN  04/29/2024 4/29/24  Known monoclonal IgA kappa in the beta region at 0.3 g/dL. Unchanged from the previous analysis on 1/15/24.     04/29/2024    IGM 50 04/29/2024     (H) 04/29/2024      Note: for a comprehensive list of the patient's lab results, access the Results Review activity.    ASSESSMENT and PLAN:    MGUS:  - Former Michelle Mcpherson, CNP patient  - 0.3g/dL IgA Kappa M protein  - Initially Kappa FLC 11.13mg/dL, FLC ratio 8.50  - IgA 455  - 24 hour UPEP showed 43.1% or 45.7mg/24 hours Kappa FLC   - Negative osseous survey  -  Increase in FLC ratio over past couple years (currently 15.99 in 11/2023)  - 24 hour urine showed 120mg/24 hour Kappa FLC, 58.8% of total urine protein  - No overt SLiM CRAB criteria  - BMBx performed today 4/29/24, showed 6-8% plasma cells, consistent with MGUS  - L hip pain since BMBx, ordered L hip x-ray to rule out acute cause, may be musculoskeletal vs. neuropathic    RTC:  Will follow up post x-ray     TALON Monet-CNP

## 2024-06-24 ENCOUNTER — APPOINTMENT (OUTPATIENT)
Dept: PRIMARY CARE | Facility: CLINIC | Age: 69
End: 2024-06-24
Payer: MEDICARE

## 2024-06-24 VITALS
OXYGEN SATURATION: 100 % | SYSTOLIC BLOOD PRESSURE: 128 MMHG | HEART RATE: 68 BPM | WEIGHT: 195 LBS | DIASTOLIC BLOOD PRESSURE: 76 MMHG | HEIGHT: 67 IN | BODY MASS INDEX: 30.61 KG/M2

## 2024-06-24 DIAGNOSIS — M79.605 PAIN IN LEFT LEG: ICD-10-CM

## 2024-06-24 DIAGNOSIS — N40.1 BENIGN PROSTATIC HYPERPLASIA WITH NOCTURIA: Primary | ICD-10-CM

## 2024-06-24 DIAGNOSIS — R35.1 BENIGN PROSTATIC HYPERPLASIA WITH NOCTURIA: Primary | ICD-10-CM

## 2024-06-24 PROBLEM — T63.441A: Status: RESOLVED | Noted: 2023-08-23 | Resolved: 2024-06-24

## 2024-06-24 PROBLEM — B00.9 HERPESVIRAL INFECTION, UNSPECIFIED: Status: RESOLVED | Noted: 2020-03-02 | Resolved: 2024-06-24

## 2024-06-24 PROCEDURE — 1160F RVW MEDS BY RX/DR IN RCRD: CPT | Performed by: INTERNAL MEDICINE

## 2024-06-24 PROCEDURE — 1036F TOBACCO NON-USER: CPT | Performed by: INTERNAL MEDICINE

## 2024-06-24 PROCEDURE — 99213 OFFICE O/P EST LOW 20 MIN: CPT | Performed by: INTERNAL MEDICINE

## 2024-06-24 PROCEDURE — 1159F MED LIST DOCD IN RCRD: CPT | Performed by: INTERNAL MEDICINE

## 2024-06-24 ASSESSMENT — ENCOUNTER SYMPTOMS
VOMITING: 0
NAUSEA: 0
CHILLS: 0
SHORTNESS OF BREATH: 0
HEMATURIA: 0
BACK PAIN: 0
FATIGUE: 0
FREQUENCY: 1
DYSURIA: 0
DIZZINESS: 0

## 2024-06-24 NOTE — PROGRESS NOTES
"Subjective   Patient ID: Bib Dai is a 69 y.o. male who presents for Follow-up chronic medical problems.    Urinary changes slightly better.  Did not start flomax.  Can live with symptoms.    Hematology following MGUS.  Recent bone marrow shows 6-8% plasma cells.  Will have fu every 6 months.    Left upper leg pain after working in garden.  Pain up and down leg, symptoms x 6-7 weeks.  Relief with advil and stretching.  Worse with physical activities.  Pain worse top of thigh and lateral.  Same side as bone marrow biopsy.  Was using left leg to push on shovel.    Benign Prostatic Hypertrophy  This is a chronic problem. The current episode started more than 1 year ago. The problem has been waxing and waning since onset. Irritative symptoms include frequency and nocturia. Irritative symptoms do not include urgency. Obstructive symptoms include an intermittent stream, a slower stream and a weak stream. Obstructive symptoms do not include dribbling, incomplete emptying or straining. Associated symptoms include hesitancy. Pertinent negatives include no chills, dysuria, genital pain, hematuria, nausea or vomiting. The symptoms are aggravated by caffeine. Past treatments include nothing.        Review of Systems   Constitutional:  Negative for chills and fatigue.   Respiratory:  Negative for shortness of breath.    Cardiovascular:  Negative for chest pain.   Gastrointestinal:  Negative for nausea and vomiting.   Genitourinary:  Positive for frequency, hesitancy and nocturia. Negative for dysuria, hematuria, incomplete emptying and urgency.   Musculoskeletal:  Negative for back pain.        Left leg pain.  No knee pain.   Neurological:  Negative for dizziness.       Objective   /76 (BP Location: Right arm, Patient Position: Sitting)   Pulse 68   Ht 1.702 m (5' 7\")   Wt 88.5 kg (195 lb)   SpO2 100%   BMI 30.54 kg/m²     Physical Exam  Constitutional:       Appearance: Normal appearance.   Cardiovascular:      " Rate and Rhythm: Normal rate and regular rhythm.      Pulses: Normal pulses.      Heart sounds: Normal heart sounds.   Pulmonary:      Effort: Pulmonary effort is normal.      Breath sounds: Normal breath sounds.   Musculoskeletal:         General: No tenderness. Normal range of motion.   Neurological:      General: No focal deficit present.      Mental Status: He is alert.   Psychiatric:         Mood and Affect: Mood normal.         Behavior: Behavior normal.         Thought Content: Thought content normal.         Judgment: Judgment normal.         Assessment/Plan   Problem List Items Addressed This Visit             ICD-10-CM    Benign prostatic hyperplasia with nocturia - Primary N40.1, R35.1     Condition andres w/o meds.  Follow for now.         Pain in left leg M79.605     ? Overuse left leg after working in garden complicated by bone marrow biopsy.         Relevant Orders    Referral to Physical Therapy

## 2024-07-09 NOTE — PROGRESS NOTES
Physical Therapy    Physical Therapy Lower Extremity Evaluation    Patient Name: Bib Dai  MRN: [id number]  Today's Date: 7/10/2024  Time Calculation  Start Time: 0833  Stop Time: 0916  Time Calculation (min): 43 min  PT Evaluation Time Entry  PT Evaluation (Low) Time Entry: 35  PT Therapeutic Procedures Time Entry  Therapeutic Exercise Time Entry: 8                 Payor: HUMANA MEDICARE / Plan: HUMANA GOLD CHOICE / Product Type: *No Product type* /     Reason for Referral: L upper leg  General Comment: Visit 1 (AUTH) (10/8/24)    Current Problem  Problem List Items Addressed This Visit             ICD-10-CM    Pain in left leg - Primary M79.605    Relevant Orders    Follow Up In Physical Therapy        Precautions  Precautions  Precautions Comment: None       Pain  Pain Assessment: 0-10  0-10 (Numeric) Pain Score: 2  Pain at worst: 9/10    SUBJECTIVE:   Pain location: L upper leg in thigh  Also notes tingling down into shin area   Hx of back pain about 15 years ago     Onset: Pain began after working in garden 4/30/24.  States he went to bed that night and tried to turn in bed and felt a snap     Hx of bone marrow biopsy on same side-pt feels that this may be the cause of the pain. Biopsy was taking in L hip     Reviewed medical hx form     Imaging:  XR L hip-normal     Prior level of function:   Previously independent with all activity     Functional limitations:  Any physical activity   Notes over compensation   States he is shifting when walking   Sitting for prolonged periods of time   Walking distance is variable    Home setup:  Reviewed and no concern     Work:  Retired     Patient stated goal:  Work out the muscle pain     Prior tx:  No     OBJECTIVE:    Lower Extremity ROM: (WNL unless documented below) (p=pain)   ROM in Degrees  RIGHT LEFT   Hip Flexion     Hip Extension     Hip Abduction     Hip Adduction     Hip ER Min loss Min loss   Hip IR Min loss Min loss   Knee Extension     Knee Flexion        Lower Extremity STRENGTH: (WNL unless documented below) (p=pain)   MMT 5/5 max  RIGHT LEFT   Hip Flexion 5 4   Hip Extension 4 4   Hip Abduction 4+ 4+   Hip Adduction 4+ 4+   Hip ER 5 5   Hip IR 5 5   Knee Extension 5 5   Knee Flexion 5 5     Lower Extremity FLEXIBILITY: (WNL unless documented below) (p=pain)  Flexibility  RIGHT LEFT   Quad 6 7 in pain    Hamstring  25 25   Hip flexor  Mod loss Mod loss   Piriformis  Mod loss Mod loss   ITB  NT NT     Non TTP over L quad, mild TTP over L ITB region     Lumbar ROM:   Flexion WNL    Extension Mod loss     RIGHT LEFT   Side Bend WNL, produced LLE symptoms Mod loss, mild increase in LLE      Marleny Lumbar repeated movements:   Pretest Symptoms: 1-2/10 L thigh and shin    RFIS Increase in LLE symptoms, NW    LAURA Increase, NW          *Limited testing due to time-will continue to assess lumbar spine     Special tests: (WNL unless documented below)    RIGHT LEFT   Slump  - -   SLR - -   Quinten NT NT      Myotomes: (WNL unless documented below)     Dermatomes: (WNL unless documented below)     Outcome Measure:  Other Measures  Lower Extremity Funtional Score (LEFS): 39/80    TREATMENT:  Initial evaluation completed. Issued and reviewed HEP with pt that included:  Access Code: [email or web address]  URL: [email or web address]  Date: 07/10/2024  Prepared by: Clarisa Yao    Exercises  - Standing Lumbar Extension  - 7 x weekly - 10 reps - every 2-3 hours frequency   - Modified Dung Stretch  - 1 x daily - 7 x weekly - 2 reps - 30 seconds hold  - Prone Quadriceps Stretch with Strap  - 1 x daily - 7 x weekly - 3 sets - 2 reps - 30 seconds hold  - Supine ITB Stretch with Strap  - 1 x daily - 7 x weekly - 2 reps - 30 seconds hold  - Straight Leg Raise  - 1 x daily - 7 x weekly - 2-3 sets - 10 reps  - Supine Bridge  - 1 x daily - 7 x weekly - 2-3 sets - 10 reps    Pt educated on the importance of monitoring symptoms for centralization and peripheralization with  all activity and exercises.   Pt inquired about DN   Handout provided for DN information      ASSESSEMENT  The pt presents with signs and symptoms consistent with the physical therapy diagnosis of LLE pain. Initially in upper thigh but experiences some N/T into shin region. Pt believes it may be a result of bone marrow biopsy and working in garden. Hx of back pain. Questionable lumbar involvement. Will continue to assess.  Pt demonstrates decreased LLE flexibility and strength, Decreased lumbar mobility. The pt will benefit from skilled physical therapy to reduce impairments in order to return to prior level of function, reduce pain, increase strength and ROM and restore gait/balance.     The physical therapy prognosis is good for the patient to achieve their goals.   The pt tolerated therapy treatment today well with no adverse effects.    Personal factors/barriers to learning that impact therapy include:  None  Clinical presentation: Stable and/or uncomplicated characteristics  Level of clinical decision making is Low  dressing  PLAN  The pt will be seen 1-2 time(s) a week for 5 weeks.      The pt has been educated about the risks and benefits of physical therapy including manual therapy treatments and gives consent for treatment.     The patient will benefit from physical therapy treatment to include: therapeutic exercises, therapeutic activities, neurological re-education, manual therapy, modalities, and a home exercise program.     Goals:  Active       PT Problem       Reduce pain at worst to 3/10 with all functional and recreational activity.        Start:  07/10/24    Expected End:  10/08/24            Increase ROM/flexibility to WFL to perform daily functional activities including bed mobility       Start:  07/10/24    Expected End:  10/08/24            Increase by > or = 1/2 mm grade to improve postural awareness and body mechanics to perform daily tasks without increased pain/compensation            Start:   07/10/24    Expected End:  10/08/24            Pt to score an increase of 10 points or > on LEFS to display overall increased function.         Start:  07/10/24    Expected End:  10/08/24            Patient will demonstrate independence in home program for support of progression       Start:  07/10/24    Expected End:  10/08/24

## 2024-07-10 ENCOUNTER — EVALUATION (OUTPATIENT)
Dept: PHYSICAL THERAPY | Facility: CLINIC | Age: 69
End: 2024-07-10
Payer: MEDICARE

## 2024-07-10 DIAGNOSIS — M79.605 PAIN IN LEFT LEG: Primary | ICD-10-CM

## 2024-07-10 PROCEDURE — 97161 PT EVAL LOW COMPLEX 20 MIN: CPT | Mod: GP | Performed by: PHYSICAL THERAPIST

## 2024-07-10 PROCEDURE — 97110 THERAPEUTIC EXERCISES: CPT | Mod: GP | Performed by: PHYSICAL THERAPIST

## 2024-07-10 ASSESSMENT — PAIN SCALES - GENERAL: PAINLEVEL_OUTOF10: 2

## 2024-07-10 ASSESSMENT — PATIENT HEALTH QUESTIONNAIRE - PHQ9
SUM OF ALL RESPONSES TO PHQ9 QUESTIONS 1 AND 2: 0
2. FEELING DOWN, DEPRESSED OR HOPELESS: NOT AT ALL
1. LITTLE INTEREST OR PLEASURE IN DOING THINGS: NOT AT ALL

## 2024-07-10 ASSESSMENT — ENCOUNTER SYMPTOMS
OCCASIONAL FEELINGS OF UNSTEADINESS: 0
DEPRESSION: 0
LOSS OF SENSATION IN FEET: 0

## 2024-07-10 ASSESSMENT — PAIN - FUNCTIONAL ASSESSMENT: PAIN_FUNCTIONAL_ASSESSMENT: 0-10

## 2024-07-22 ENCOUNTER — TREATMENT (OUTPATIENT)
Dept: PHYSICAL THERAPY | Facility: CLINIC | Age: 69
End: 2024-07-22
Payer: MEDICARE

## 2024-07-22 DIAGNOSIS — M79.605 PAIN IN LEFT LEG: ICD-10-CM

## 2024-07-22 PROCEDURE — 97110 THERAPEUTIC EXERCISES: CPT | Mod: GP,CQ

## 2024-07-22 ASSESSMENT — PAIN - FUNCTIONAL ASSESSMENT: PAIN_FUNCTIONAL_ASSESSMENT: 0-10

## 2024-07-22 ASSESSMENT — PAIN SCALES - GENERAL: PAINLEVEL_OUTOF10: 2

## 2024-07-22 NOTE — PROGRESS NOTES
Physical Therapy Treatment    Patient Name: Bib Dai  MRN: [id number]  Today's Date: 7/22/2024  Visit #2  Time Calculation  Start Time: 1130  Stop Time: 1215  Time Calculation (min): 45 min     PT Therapeutic Procedures Time Entry  Therapeutic Exercise Time Entry: 45                 Payor: [employer] MEDICARE / Plan: HUMANA GOLD CHOICE / Product Type: *No Product type* /   Miguel Elizondo  Reason for Referral: L upper leg  General Comment: Visit 2 of 8(AUTH) (10/8/24)      Current Problem:  Problem List Items Addressed This Visit             ICD-10-CM    Pain in left leg M79.605     Subjective   General:  L upper leg in thigh persists without much change.   Also notes tingling down into shin area and did experience it after back bends so he did not continue those.     HEP Compliance: Yes     Patient stated goal: Work out the muscle pain     Pain:  Pain Assessment: 0-10  0-10 (Numeric) Pain Score: 2  Pain Location: Leg  Pain Orientation: Left      Precautions:  Precautions  Precautions Comment: None      Objective   No objective measures taken this visit    Treatment:  Therapeutic exercise  Nustep L4 x 5 min LE's only   LAURA x4 - worse, tingling in calf so discontinued   L hip flex stretch supine x45 sec - tingling in calf at end   Prone lying x2 min  RU x1 min - tingling in L calf   Prone hip ext x10 pain in L quad   Sit to stand with good posture 2x10   Standing hip abd, ext, flex x10   BL piriformis stretch x1 min, seated   BL HSS x 1 min, seated   L fwd step up 6'' x10 no UE support   Standing HR/TR 2x10       Assessment   Pt L LE sensitive with repeated movements and exercises.   Extension exercises increased radicular pain in L LE so discontinued. Updated HEP   Difficult to distinguish between muscle fatigue vs pain with Wb'ing exercises- will continue to assess and monitor.  Pt tends to shift to R to avoid putting pressure on LLE when standing and sitting, requiring cues to distribute equal weight BL.   No  increased pain or radic symptoms post tx.   Plan    Continue to progress POC as tolerated by patient to improve strength, mobility and overall function      HEP:  Access Code: [email or web address]  URL: [email or web address]  Date: 07/10/2024  Prepared by: Clarisa Yao    Exercises  - Modified Udng Stretch  - 1 x daily - 7 x weekly - 2 reps - 30 seconds hold  - Prone Quadriceps Stretch with Strap  - 1 x daily - 7 x weekly - 3 sets - 2 reps - 30 seconds hold  - Supine ITB Stretch with Strap  - 1 x daily - 7 x weekly - 2 reps - 30 seconds hold  - Straight Leg Raise  - 1 x daily - 7 x weekly - 2-3 sets - 10 reps  - Supine Bridge  - 1 x daily - 7 x weekly - 2-3 sets - 10 reps  Goals:  Active       PT Problem       Reduce pain at worst to 3/10 with all functional and recreational activity.        Start:  07/10/24    Expected End:  10/08/24            Increase ROM/flexibility to WFL to perform daily functional activities including bed mobility       Start:  07/10/24    Expected End:  10/08/24            Increase by > or = 1/2 mm grade to improve postural awareness and body mechanics to perform daily tasks without increased pain/compensation            Start:  07/10/24    Expected End:  10/08/24            Pt to score an increase of 10 points or > on LEFS to display overall increased function.         Start:  07/10/24    Expected End:  10/08/24            Patient will demonstrate independence in home program for support of progression       Start:  07/10/24    Expected End:  10/08/24

## 2024-07-31 ENCOUNTER — DOCUMENTATION (OUTPATIENT)
Dept: PHYSICAL THERAPY | Facility: CLINIC | Age: 69
End: 2024-07-31
Payer: MEDICARE

## 2024-07-31 NOTE — PROGRESS NOTES
Physical Therapy                 Therapy Communication Note    Patient Name: Bib Dai  MRN: [id number]  Today's Date: 7/31/2024     Discipline: Physical Therapy    Missed Time: No Show    Comment: Spoke with pt and he thought his appt was tomorrow. We were able to schedule him tomorrow at 3:30

## 2024-08-01 ENCOUNTER — TREATMENT (OUTPATIENT)
Dept: PHYSICAL THERAPY | Facility: CLINIC | Age: 69
End: 2024-08-01
Payer: MEDICARE

## 2024-08-01 DIAGNOSIS — M79.605 PAIN IN LEFT LEG: ICD-10-CM

## 2024-08-01 PROCEDURE — 97110 THERAPEUTIC EXERCISES: CPT | Mod: GP,CQ

## 2024-08-01 ASSESSMENT — PAIN - FUNCTIONAL ASSESSMENT: PAIN_FUNCTIONAL_ASSESSMENT: 0-10

## 2024-08-01 ASSESSMENT — PAIN SCALES - GENERAL: PAINLEVEL_OUTOF10: 2

## 2024-08-01 ASSESSMENT — PAIN DESCRIPTION - DESCRIPTORS: DESCRIPTORS: DULL

## 2024-08-01 NOTE — PROGRESS NOTES
Physical Therapy Treatment    Patient Name: Bib Dai  MRN: [id number]  Today's Date: 8/1/2024  Visit #3  Time Calculation  Start Time: 1530  Stop Time: 1615  Time Calculation (min): 45 min     PT Therapeutic Procedures Time Entry  Therapeutic Exercise Time Entry: 45                 Payor: [employer] MEDICARE / Plan: HUMANA GOLD CHOICE / Product Type: *No Product type* /   Miguel Elizondo  Reason for Referral: L upper leg  General Comment: Visit 3 of 8(AUTH) (10/8/24)      Current Problem:  Problem List Items Addressed This Visit             ICD-10-CM    Pain in left leg M79.605       Subjective   General:  Pt reports pain in L thigh/hip persists, no tingling in leg currently.   Noticing some improvement but unclear.   Whenever he does any increased activity including walking, the pain increases.     HEP Compliance: Yes     Patient stated goal: Work out the muscle pain     Pain:  Pain Assessment: 0-10  0-10 (Numeric) Pain Score: 2  Pain Location: Leg  Pain Orientation: Left  Pain Descriptors: Dull      Precautions:  Precautions  Precautions Comment: None      Objective   No objective measures taken this visit    Treatment:  Therapeutic exercise  Nustep L4 x 5 min LE's only / Upright bike L3 x 5 min   Prone hip ext x10 pain in L quad   Sit to stand with good posture 2x10   Standing hip abd, ext, flex x10 added to HEP   BL piriformis stretch x1 min, seated   BL HSS x 1 min, seated   L fwd step up 6'' 2x10 no UE support   L lat step up 6'' 2x10   Standing HR/TR on 1/2 foam  2x10   Monster walk RTB along railing no Ue support x2 L   Inchworm RTB along railing x 2 L back and forth       Trialed/pain worse:  LAURA x4 - worse, tingling in calf so discontinued   L hip flex stretch supine x45 sec - tingling in calf at end   Prone lying x2 min  RU x1 min - tingling in L calf       Manual:  Trigger point release to L ITB, quad, glute       Assessment   Pt tolerated session well. Did voice some tingling in L shin with HR on 1/2  foam so discontinued.   Symptoms diminished shorly after stopping exercise.  Difficult to distinguish between muscle fatigue vs pain with Wb'ing exercises- will continue to assess and monitor.  Pt not TTP with manual tx.  No increased pain or radic symptoms post tx.   Plan    Continue to assess lumbar involvement with association to L LE symptoms       HEP:  Access Code: [email or web address]  URL: [email or web address]  Date: 08/01/2024  Prepared by: Clarisa Melton    Exercises  - Modified Dung Stretch  - 1 x daily - 7 x weekly - 2 reps - 30 seconds hold  - Prone Quadriceps Stretch with Strap  - 1 x daily - 7 x weekly - 3 sets - 2 reps - 30 seconds hold  - Supine ITB Stretch with Strap  - 1 x daily - 7 x weekly - 2 reps - 30 seconds hold  - Seated Piriformis Stretch  - 1 x daily - 7 x weekly - 2 sets - 30 sec-1 min  hold  - Hip Abduction with Resistance Loop  - 1 x daily - 7 x weekly - 2 sets - 10 reps  - Hip Extension with Resistance Loop  - 1 x daily - 7 x weekly - 2 sets - 10 reps  - Standing Hip Flexion with Resistance Loop  - 1 x daily - 7 x weekly - 2 sets - 10 reps    Goals:  Active       PT Problem       Reduce pain at worst to 3/10 with all functional and recreational activity.        Start:  07/10/24    Expected End:  10/08/24            Increase ROM/flexibility to WFL to perform daily functional activities including bed mobility       Start:  07/10/24    Expected End:  10/08/24            Increase by > or = 1/2 mm grade to improve postural awareness and body mechanics to perform daily tasks without increased pain/compensation            Start:  07/10/24    Expected End:  10/08/24            Pt to score an increase of 10 points or > on LEFS to display overall increased function.         Start:  07/10/24    Expected End:  10/08/24            Patient will demonstrate independence in home program for support of progression       Start:  07/10/24    Expected End:  10/08/24

## 2024-08-07 ENCOUNTER — TREATMENT (OUTPATIENT)
Dept: PHYSICAL THERAPY | Facility: CLINIC | Age: 69
End: 2024-08-07
Payer: MEDICARE

## 2024-08-07 DIAGNOSIS — M79.605 PAIN IN LEFT LEG: ICD-10-CM

## 2024-08-07 PROCEDURE — 97140 MANUAL THERAPY 1/> REGIONS: CPT | Mod: GP | Performed by: PHYSICAL THERAPIST

## 2024-08-07 PROCEDURE — 97110 THERAPEUTIC EXERCISES: CPT | Mod: GP | Performed by: PHYSICAL THERAPIST

## 2024-08-07 ASSESSMENT — PAIN SCALES - GENERAL: PAINLEVEL_OUTOF10: 2

## 2024-08-07 ASSESSMENT — PAIN - FUNCTIONAL ASSESSMENT: PAIN_FUNCTIONAL_ASSESSMENT: 0-10

## 2024-08-07 NOTE — PROGRESS NOTES
Physical Therapy Treatment    Patient Name: Bib Dai  MRN: [id number]  Today's Date: 8/7/2024  Visit #4  Time Calculation  Start Time: 0917  Stop Time: 0956  Time Calculation (min): 39 min     PT Therapeutic Procedures Time Entry  Manual Therapy Time Entry: 30  Therapeutic Exercise Time Entry: 9                 Payor: HUMANA MEDICARE / Plan: HUMANA GOLD CHOICE / Product Type: *No Product type* /   Miguel Elizondo  Reason for Referral: L upper leg  General Comment: Visit 4 of 8 (10/8/24)      Current Problem:  Problem List Items Addressed This Visit             ICD-10-CM    Pain in left leg M79.605         Subjective   General:  Pt reports pain in L thigh/hip persists. States it does feel muscular. N/T near shin bone. Some of the exericses. Does increase this.   HEP Compliance: Yes     Patient stated goal: Work out the muscle pain     Pain:  Pain Assessment: 0-10  0-10 (Numeric) Pain Score: 2      Precautions:  Precautions  Precautions Comment: None      Objective   No objective measures taken this visit    Treatment:  Therapeutic exercise  Nustep L4 x 5 min LE's only / Upright bike L3 x 5 min   Prone hip ext x10 pain in L quad     Held due to DN:  Sit to stand with good posture 2x10   Standing hip abd, ext, flex x10 added to HEP   BL piriformis stretch x1 min, seated   BL HSS x 1 min, seated   L fwd step up 6'' 2x10 no UE support   L lat step up 6'' 2x10   Standing HR/TR on 1/2 foam  2x10   Monster walk RTB along railing no Ue support x2 L   Inchworm RTB along railing x 2 L back and forth       Trialed/pain worse:  LAURA x4 - worse, tingling in calf so discontinued   L hip flex stretch supine x45 sec - tingling in calf at end   Prone lying x2 min  RU x1 min - tingling in L calf       Manual:  Verbal and written education provided to patient this date regarding TrP dry needling and associated risks and benefits. Patient verbalizes understanding of associated risks and benefits with TrP dry needling, provides  verbal consent to proceed, and denies questions at this time. Dry needling utilized this date to address ongoing pain and dysfunction in L quad. Soft tissue assessment completed via manual palpation with active TrPs, muscular hypertonicity, and pain noted throughout.     Treatment: 50 mm needle inserted into L VM, VL and VI and left in situ/performed with pistoning/performed with needle rotation.     Needle site clear and without adverse reaction immediately post needle removal. Patient advised to call PT if excessive soreness, bruising, or adverse event is noted post needling. Patient verbalizes understanding and denies questions at this time.      Cupping using static and gliding technique to L quad.    Assessment  Pt tolerated TDN well with +LTR in VL mm. Discussed that his symptom origin continues to be puzzling and that trial of DN would be used to help dx possible MM involvement which is what the pt believes may be the cause. Discussed dermatomal patterns however as there may be a lumbar component. Will continue to assess    Plan    Continue to assess lumbar involvement with association to L LE symptoms       HEP:  Access Code: [email or web address]  URL: [email or web address]  Date: 08/01/2024  Prepared by: Clarisa Melton    Exercises  - Modified Dung Stretch  - 1 x daily - 7 x weekly - 2 reps - 30 seconds hold  - Prone Quadriceps Stretch with Strap  - 1 x daily - 7 x weekly - 3 sets - 2 reps - 30 seconds hold  - Supine ITB Stretch with Strap  - 1 x daily - 7 x weekly - 2 reps - 30 seconds hold  - Seated Piriformis Stretch  - 1 x daily - 7 x weekly - 2 sets - 30 sec-1 min  hold  - Hip Abduction with Resistance Loop  - 1 x daily - 7 x weekly - 2 sets - 10 reps  - Hip Extension with Resistance Loop  - 1 x daily - 7 x weekly - 2 sets - 10 reps  - Standing Hip Flexion with Resistance Loop  - 1 x daily - 7 x weekly - 2 sets - 10 reps    Goals:  Active       PT Problem       Reduce pain at worst to  3/10 with all functional and recreational activity.        Start:  07/10/24    Expected End:  10/08/24            Increase ROM/flexibility to WFL to perform daily functional activities including bed mobility       Start:  07/10/24    Expected End:  10/08/24            Increase by > or = 1/2 mm grade to improve postural awareness and body mechanics to perform daily tasks without increased pain/compensation            Start:  07/10/24    Expected End:  10/08/24            Pt to score an increase of 10 points or > on LEFS to display overall increased function.         Start:  07/10/24    Expected End:  10/08/24            Patient will demonstrate independence in home program for support of progression       Start:  07/10/24    Expected End:  10/08/24

## 2024-08-14 ENCOUNTER — TREATMENT (OUTPATIENT)
Dept: PHYSICAL THERAPY | Facility: CLINIC | Age: 69
End: 2024-08-14
Payer: MEDICARE

## 2024-08-14 DIAGNOSIS — M79.605 PAIN IN LEFT LEG: Primary | ICD-10-CM

## 2024-08-14 PROCEDURE — 97110 THERAPEUTIC EXERCISES: CPT | Mod: GP | Performed by: PHYSICAL THERAPIST

## 2024-08-14 PROCEDURE — 97140 MANUAL THERAPY 1/> REGIONS: CPT | Mod: GP | Performed by: PHYSICAL THERAPIST

## 2024-08-14 ASSESSMENT — PAIN SCALES - GENERAL: PAINLEVEL_OUTOF10: 2

## 2024-08-14 NOTE — PROGRESS NOTES
Physical Therapy Treatment    Patient Name: Bib Dai  MRN: [id number]  Today's Date: 8/14/2024  Visit #5  Time Calculation  Start Time: 0917  Stop Time: 0958  Time Calculation (min): 41 min     PT Therapeutic Procedures Time Entry  Manual Therapy Time Entry: 30  Therapeutic Exercise Time Entry: 11                 Payor: HUMANA MEDICARE / Plan: HUMANA GOLD CHOICE / Product Type: *No Product type* /   Miguel Elizondo  Reason for Referral: L upper leg  General Comment: Visit 5 of 8 (10/8/24)      Current Problem:  Problem List Items Addressed This Visit             ICD-10-CM    Pain in left leg - Primary M79.605           Subjective   General:  Pt reports yesterday he did a lot around the house including fertilizing. He felt good until 7 pm and experienced stiffening. This morning he still feels tightness. States slow improvement but very gradual. He is not experiencing severe pain at night    HEP Compliance: Yes     Patient stated goal: Work out the muscle pain     Pain:  0-10 (Numeric) Pain Score: 2      Precautions:  Precautions  Precautions Comment: None      Objective   No objective measures taken this visit    Treatment:  Therapeutic exercise  Nustep L4 x 5 min LE's only / Upright bike L3 x 5 min   Prone hip ext x10 pain in L quad   Monster walk RTB crossed 40 ft x 2   Inchworm RTB crossed 40 ft x 2     Held due to DN:  Sit to stand with good posture 2x10   Standing hip abd, ext, flex x10 added to HEP   BL piriformis stretch x1 min, seated   BL HSS x 1 min, seated   L fwd step up 6'' 2x10 no UE support   L lat step up 6'' 2x10   Standing HR/TR on 1/2 foam  2x10   Monster walk RTB crossed 40 ft x 2   Inchworm RTB crossed 40 ft x 2           Manual:  Verbal and written education provided to patient this date regarding TrP dry needling and associated risks and benefits. Patient verbalizes understanding of associated risks and benefits with TrP dry needling, provides verbal consent to proceed, and denies  questions at this time. Dry needling utilized this date to address ongoing pain and dysfunction in L quad. Soft tissue assessment completed via manual palpation with active TrPs, muscular hypertonicity, and pain noted throughout.     Treatment: 50 mm needle inserted into L VM, VL and VI and left in situ/performed with pistoning/performed with needle rotation.     Needle site clear and without adverse reaction immediately post needle removal. Patient advised to call PT if excessive soreness, bruising, or adverse event is noted post needling. Patient verbalizes understanding and denies questions at this time.      Cupping using static and gliding technique to L quad.    Assessment  Pt noted feeling difference in LLE compared to R. States it is not weakness that he feels. No significant LTR noted with DN today. Tolerated all manual tx well. Will continue to assess response.     Plan    Continue to assess lumbar involvement with association to L LE symptoms       HEP:  Access Code: [email or web address]  URL: [email or web address]  Date: 08/01/2024  Prepared by: Clarisa Melton    Exercises  - Modified Dung Stretch  - 1 x daily - 7 x weekly - 2 reps - 30 seconds hold  - Prone Quadriceps Stretch with Strap  - 1 x daily - 7 x weekly - 3 sets - 2 reps - 30 seconds hold  - Supine ITB Stretch with Strap  - 1 x daily - 7 x weekly - 2 reps - 30 seconds hold  - Seated Piriformis Stretch  - 1 x daily - 7 x weekly - 2 sets - 30 sec-1 min  hold  - Hip Abduction with Resistance Loop  - 1 x daily - 7 x weekly - 2 sets - 10 reps  - Hip Extension with Resistance Loop  - 1 x daily - 7 x weekly - 2 sets - 10 reps  - Standing Hip Flexion with Resistance Loop  - 1 x daily - 7 x weekly - 2 sets - 10 reps    Goals:  Active       PT Problem       Reduce pain at worst to 3/10 with all functional and recreational activity.        Start:  07/10/24    Expected End:  10/08/24            Increase ROM/flexibility to WFL to perform daily  functional activities including bed mobility       Start:  07/10/24    Expected End:  10/08/24            Increase by > or = 1/2 mm grade to improve postural awareness and body mechanics to perform daily tasks without increased pain/compensation            Start:  07/10/24    Expected End:  10/08/24            Pt to score an increase of 10 points or > on LEFS to display overall increased function.         Start:  07/10/24    Expected End:  10/08/24            Patient will demonstrate independence in home program for support of progression       Start:  07/10/24    Expected End:  10/08/24

## 2024-08-21 ENCOUNTER — TREATMENT (OUTPATIENT)
Dept: PHYSICAL THERAPY | Facility: CLINIC | Age: 69
End: 2024-08-21
Payer: MEDICARE

## 2024-08-21 DIAGNOSIS — M79.605 PAIN IN LEFT LEG: ICD-10-CM

## 2024-08-21 PROCEDURE — 97140 MANUAL THERAPY 1/> REGIONS: CPT | Mod: GP,CQ

## 2024-08-21 PROCEDURE — 97110 THERAPEUTIC EXERCISES: CPT | Mod: GP,CQ

## 2024-08-21 ASSESSMENT — PAIN - FUNCTIONAL ASSESSMENT: PAIN_FUNCTIONAL_ASSESSMENT: 0-10

## 2024-08-21 ASSESSMENT — PAIN SCALES - GENERAL: PAINLEVEL_OUTOF10: 2

## 2024-08-21 NOTE — PROGRESS NOTES
Physical Therapy Treatment    Patient Name: Bib Dai  MRN: [id number]  Today's Date: 8/21/2024  Visit #6  Time Calculation  Start Time: 1135  Stop Time: 1215  Time Calculation (min): 40 min     PT Therapeutic Procedures Time Entry  Manual Therapy Time Entry: 15  Therapeutic Exercise Time Entry: 25                 Payor: [employer] MEDICARE / Plan: HU[employer]NA GOLD CHOICE / Product Type: *No Product type* /   Miguel Elizondo  General Comment: Visit 6 of 8 (10/8/24)      Current Problem:  Problem List Items Addressed This Visit             ICD-10-CM    Pain in left leg M79.605             Subjective   General:  Pt reports he continues to experience R LE pain. Pain is overall unchanged. It does not limit him from completing what he needs to do around the house but does cause a great deal of discomfort. He is unable to distribute equal weight into LE's, shifts to R to take pressure off of L LE.   HEP Compliance: Yes     Patient stated goal: Work out the muscle pain     Pain:  Pain Assessment: 0-10  0-10 (Numeric) Pain Score: 2  Pain Location: Hip  Pain Orientation: Left      Precautions:  Precautions  Precautions Comment: None      Objective   No objective measures taken this visit    Treatment:  Therapeutic exercise  Nustep L4 x 5 min LE's only / Upright bike L3 x 5 min   Prone lying x 2 min- tingling in leg diminished  RU x1 min - tinging in calf increased  Lateral glide on wall - NE on LE symptoms  Prone lying x1 min   Roadkill position with L LE  x 1 min- relief.     Prone hip ext 2x10   Monster walk RTB crossed 40 ft x 2   Inchworm RTB crossed 40 ft x 2     Held:  Sit to stand with good posture 2x10   Standing hip abd, ext, flex x10 added to HEP   BL piriformis stretch x1 min, seated   BL HSS x 1 min, seated   L fwd step up 6'' 2x10 no UE support   L lat step up 6'' 2x10   Standing HR/TR on 1/2 foam  2x10   Monster walk RTB crossed 40 ft x 2   Inchworm RTB crossed 40 ft x 2           Manual:  Lumbar traction - no  tingling in calf, provided some relief in L LE    TPR to L glute/ piriformis     Assessment  L LE pain eased with prone lying. Worsened with RU so discontinued.   Trialed lateral glides and road kill position. Road kill position provided some relief. Added prone lying to HEP to trial if this provides him any lasting relief.   Issued body mechanics handout.     Plan    Monitor response to prone lying       HEP:  Access Code: [email or web address]  URL: [email or web address]  Date: 08/21/2024  Prepared by: Clarisa Melton    Exercises  - Lying Prone  - 2-3 x daily - 7 x weekly - 1 sets - 1 reps - 3-5 min hold  - Modified Dung Stretch  - 1 x daily - 7 x weekly - 2 reps - 30 seconds hold  - Prone Quadriceps Stretch with Strap  - 1 x daily - 7 x weekly - 3 sets - 2 reps - 30 seconds hold  - Supine ITB Stretch with Strap  - 1 x daily - 7 x weekly - 2 reps - 30 seconds hold  - Seated Piriformis Stretch  - 1 x daily - 7 x weekly - 2 sets - 30 sec-1 min  hold  - Hip Abduction with Resistance Loop  - 1 x daily - 7 x weekly - 2 sets - 10 reps  - Hip Extension with Resistance Loop  - 1 x daily - 7 x weekly - 2 sets - 10 reps  - Standing Hip Flexion with Resistance Loop  - 1 x daily - 7 x weekly - 2 sets - 10 reps    Goals:  Active       PT Problem       Reduce pain at worst to 3/10 with all functional and recreational activity.        Start:  07/10/24    Expected End:  10/08/24            Increase ROM/flexibility to WFL to perform daily functional activities including bed mobility       Start:  07/10/24    Expected End:  10/08/24            Increase by > or = 1/2 mm grade to improve postural awareness and body mechanics to perform daily tasks without increased pain/compensation            Start:  07/10/24    Expected End:  10/08/24            Pt to score an increase of 10 points or > on LEFS to display overall increased function.         Start:  07/10/24    Expected End:  10/08/24            Patient will  demonstrate independence in home program for support of progression       Start:  07/10/24    Expected End:  10/08/24

## 2024-08-29 ENCOUNTER — TREATMENT (OUTPATIENT)
Dept: PHYSICAL THERAPY | Facility: CLINIC | Age: 69
End: 2024-08-29
Payer: MEDICARE

## 2024-08-29 DIAGNOSIS — M79.605 PAIN IN LEFT LEG: ICD-10-CM

## 2024-08-29 PROCEDURE — 97110 THERAPEUTIC EXERCISES: CPT | Mod: GP | Performed by: PHYSICAL THERAPIST

## 2024-08-29 PROCEDURE — 97140 MANUAL THERAPY 1/> REGIONS: CPT | Mod: GP | Performed by: PHYSICAL THERAPIST

## 2024-08-29 ASSESSMENT — PAIN - FUNCTIONAL ASSESSMENT: PAIN_FUNCTIONAL_ASSESSMENT: 0-10

## 2024-08-29 ASSESSMENT — PAIN SCALES - GENERAL: PAINLEVEL_OUTOF10: 2

## 2024-08-29 NOTE — PROGRESS NOTES
Physical Therapy    Physical Therapy Treatment    Patient Name: Bib Dai  MRN: [id number]  : 1955   Miguel Elizondo  Today's Date: 2024  Time Calculation  Start Time: 1400  Stop Time: 1447  Time Calculation (min): 47 min  PT Therapeutic Procedures Time Entry  Manual Therapy Time Entry: 10  Therapeutic Exercise Time Entry: 37           General  General Comment: Visit 7 of 8 (10/8/24)  Visit #7     Current Problem  Problem List Items Addressed This Visit             ICD-10-CM    Pain in left leg M79.605            Subjective   Pt. Reports that he has completed prone lying at home with no significant change in Sx.     Pt.'s name and  were confirmed this date.    Pt. Reports compliance with HEP.    Precautions  Precautions  Precautions Comment: None    Pain  Pain Assessment: 0-10  0-10 (Numeric) Pain Score: 2  Pain Location: Hip  Pain Orientation: Left    Objective      Leg length discrepancy with right shorter.  Right shifted posture.  Increased thoracic kyphosis.  Outcome Measures:  Pt.'s name and  were confirmed today    Treatments:    Therapeutic exercise   Nustep L4 x 5 min LE's only / Upright bike L3 x 5 min     Left side glide 2 sec 3x5  Gait with upright posture    Held:  Sit to stand with good posture 2x10   Standing hip abd, ext, flex x10 added to HEP   BL piriformis stretch x1 min, seated   BL HSS x 1 min, seated   L fwd step up 6'' 2x10 no UE support   L lat step up 6'' 2x10   Standing HR/TR on  foam  2x10   Monster walk RTB crossed 40 ft x 2   Inchworm RTB crossed 40 ft x 2         Pt. Is to continue side glides   Manual:  Lumbar traction -10 minutes      Assessment:   Left LE Sx nearly eliminated with side glides and manual traction today    Shifted posture improved with side glides    Plan:   Assess Sx with side glides    Goals:  Active       PT Problem       Reduce pain at worst to 3/10 with all functional and recreational activity.        Start:  07/10/24    Expected End:   10/08/24            Increase ROM/flexibility to WFL to perform daily functional activities including bed mobility       Start:  07/10/24    Expected End:  10/08/24            Increase by > or = 1/2 mm grade to improve postural awareness and body mechanics to perform daily tasks without increased pain/compensation            Start:  07/10/24    Expected End:  10/08/24            Pt to score an increase of 10 points or > on LEFS to display overall increased function.         Start:  07/10/24    Expected End:  10/08/24            Patient will demonstrate independence in home program for support of progression       Start:  07/10/24    Expected End:  10/08/24

## 2024-09-03 NOTE — PROGRESS NOTES
Physical Therapy Treatment    Patient Name: Bib Dai  MRN: [id number]  Today's Date: 9/4/2024  Visit #8  Time Calculation  Start Time: 1318  Stop Time: 1401  Time Calculation (min): 43 min     PT Therapeutic Procedures Time Entry  Manual Therapy Time Entry: 33  Therapeutic Exercise Time Entry: 10                 Payor: HUMANA MEDICARE / Plan: HUMANA GOLD CHOICE / Product Type: *No Product type* /   Miguel Elizondo  General Comment: Visit 8 of 8 (10/8/24)      Current Problem:  Problem List Items Addressed This Visit             ICD-10-CM    Pain in left leg M79.605               Subjective   General:  Pt did trial some side glides after last session. States overall he notes very gradual improvement but any time he does something physical or standing too long his symptoms still persist.   He seeing Dr. Elizondo next week   States DN and cupping provided him temporary relief and would like to try that again today.     HEP Compliance: Yes     Patient stated goal: Work out the muscle pain     Pain:  Pain Assessment: 0-10  0-10 (Numeric) Pain Score: 0 - No pain      Precautions:  Precautions  Precautions Comment: None      Objective   Lower Extremity ROM: (WFL unless documented below) (p=pain)      Lower Extremity STRENGTH: (WFL unless documented below) (p=pain)      Lower Extremity FLEXIBILITY: (WFL unless documented below) (p=pain)     Non TTP over L quad, mild TTP over L ITB region      Lumbar ROM:        Flexion WNL    Extension Mod  loss           Outcome Measure:  Other Measures  Lower Extremity Funtional Score (LEFS): 34/80      Treatment:  Therapeutic exercise  Nustep L4 x 5 min LE's only / Upright bike L3 x 5 min   Recheck     Manual:  Verbal and written education provided to patient this date regarding TrP dry needling and associated risks and benefits. Patient verbalizes understanding of associated risks and benefits with TrP dry needling, provides verbal consent to proceed, and denies questions at this  time. Dry needling utilized this date to address ongoing pain and dysfunction in L quad. Soft tissue assessment completed via manual palpation with active TrPs, muscular hypertonicity, and pain noted throughout.      Treatment: 50 mm needle inserted into L VM, VL and VI and left in situ/performed with pistoning/performed with needle rotation.      Needle site clear and without adverse reaction immediately post needle removal. Patient advised to call PT if excessive soreness, bruising, or adverse event is noted post needling. Patient verbalizes understanding and denies questions at this time.       Cupping using static and gliding technique to L quad.       Assessment  We have trialed various tx interventions with little to no relief.   Pt symptoms continue to be puzzling.   Difficulty with differential dx.   Recommend return to MD due to lack of expected improvement at end of 8 sessions.       Plan  Return to MD next week due to lack of improvement  Discharge from PT     HEP:  Access Code: [email or web address]  URL: [email or web address]  Date: 08/21/2024  Prepared by: Clarisa Melton    Exercises  - Lying Prone  - 2-3 x daily - 7 x weekly - 1 sets - 1 reps - 3-5 min hold  - Modified Dung Stretch  - 1 x daily - 7 x weekly - 2 reps - 30 seconds hold  - Prone Quadriceps Stretch with Strap  - 1 x daily - 7 x weekly - 3 sets - 2 reps - 30 seconds hold  - Supine ITB Stretch with Strap  - 1 x daily - 7 x weekly - 2 reps - 30 seconds hold  - Seated Piriformis Stretch  - 1 x daily - 7 x weekly - 2 sets - 30 sec-1 min  hold  - Hip Abduction with Resistance Loop  - 1 x daily - 7 x weekly - 2 sets - 10 reps  - Hip Extension with Resistance Loop  - 1 x daily - 7 x weekly - 2 sets - 10 reps  - Standing Hip Flexion with Resistance Loop  - 1 x daily - 7 x weekly - 2 sets - 10 reps    Goals:  Active       PT Problem       Reduce pain at worst to 3/10 with all functional and recreational activity.  (Progressing)        Start:  07/10/24    Expected End:  10/08/24            Increase ROM/flexibility to WFL to perform daily functional activities including bed mobility (Progressing)       Start:  07/10/24    Expected End:  10/08/24            Increase by > or = 1/2 mm grade to improve postural awareness and body mechanics to perform daily tasks without increased pain/compensation      (Progressing)       Start:  07/10/24    Expected End:  10/08/24            Pt to score an increase of 10 points or > on LEFS to display overall increased function.   (Not Progressing)       Start:  07/10/24    Expected End:  10/08/24            Patient will demonstrate independence in home program for support of progression (Progressing)       Start:  07/10/24    Expected End:  10/08/24         Goal Note       Patient will demonstrate independence in home program for support of progression

## 2024-09-04 ENCOUNTER — TREATMENT (OUTPATIENT)
Dept: PHYSICAL THERAPY | Facility: CLINIC | Age: 69
End: 2024-09-04
Payer: MEDICARE

## 2024-09-04 DIAGNOSIS — M79.605 PAIN IN LEFT LEG: ICD-10-CM

## 2024-09-04 PROCEDURE — 97140 MANUAL THERAPY 1/> REGIONS: CPT | Mod: GP | Performed by: PHYSICAL THERAPIST

## 2024-09-04 PROCEDURE — 97110 THERAPEUTIC EXERCISES: CPT | Mod: GP | Performed by: PHYSICAL THERAPIST

## 2024-09-04 ASSESSMENT — PAIN SCALES - GENERAL: PAINLEVEL_OUTOF10: 0 - NO PAIN

## 2024-09-04 ASSESSMENT — PAIN - FUNCTIONAL ASSESSMENT: PAIN_FUNCTIONAL_ASSESSMENT: 0-10

## 2024-09-10 ENCOUNTER — APPOINTMENT (OUTPATIENT)
Dept: PRIMARY CARE | Facility: CLINIC | Age: 69
End: 2024-09-10
Payer: MEDICARE

## 2024-09-10 VITALS
HEART RATE: 82 BPM | DIASTOLIC BLOOD PRESSURE: 72 MMHG | WEIGHT: 198 LBS | BODY MASS INDEX: 31.08 KG/M2 | OXYGEN SATURATION: 96 % | SYSTOLIC BLOOD PRESSURE: 128 MMHG | HEIGHT: 67 IN

## 2024-09-10 DIAGNOSIS — M79.605 PAIN IN LEFT LEG: Primary | ICD-10-CM

## 2024-09-10 PROCEDURE — 1159F MED LIST DOCD IN RCRD: CPT | Performed by: INTERNAL MEDICINE

## 2024-09-10 PROCEDURE — 3008F BODY MASS INDEX DOCD: CPT | Performed by: INTERNAL MEDICINE

## 2024-09-10 PROCEDURE — 99213 OFFICE O/P EST LOW 20 MIN: CPT | Performed by: INTERNAL MEDICINE

## 2024-09-10 PROCEDURE — 1036F TOBACCO NON-USER: CPT | Performed by: INTERNAL MEDICINE

## 2024-09-10 PROCEDURE — 1124F ACP DISCUSS-NO DSCNMKR DOCD: CPT | Performed by: INTERNAL MEDICINE

## 2024-09-10 PROCEDURE — 1160F RVW MEDS BY RX/DR IN RCRD: CPT | Performed by: INTERNAL MEDICINE

## 2024-09-10 ASSESSMENT — ENCOUNTER SYMPTOMS
BACK PAIN: 0
SHORTNESS OF BREATH: 0
FATIGUE: 0

## 2024-09-10 NOTE — PROGRESS NOTES
"Subjective   Patient ID: Bib Dai is a 69 y.o. male who presents for Follow-up chronic medical problems.    Here for fu left leg pain.  PT since last ov.  Less pain but not completely resolved.  Left upper leg pain, at times will have tingling feeling with over exercise.  Now walking \"crooked\".  Same days can go most of day w/o pain.  No back pain.  Can stand for about 10 minutes before pain starts.  Can walk about 200-300 yards w/o pain or stiffness.  Xrays left hip normal.  Xrays of L spine with moderate OA.  Otc advil as needed for pain with relief.       Review of Systems   Constitutional:  Negative for fatigue.   Respiratory:  Negative for shortness of breath.    Cardiovascular:  Negative for chest pain.   Musculoskeletal:  Positive for gait problem. Negative for back pain.        Left upper leg pain.       Objective   /72 (BP Location: Right arm, Patient Position: Sitting)   Pulse 82   Ht 1.702 m (5' 7\")   Wt 89.8 kg (198 lb)   SpO2 96%   BMI 31.01 kg/m²     Physical Exam  Constitutional:       Appearance: Normal appearance.   Musculoskeletal:         General: No swelling, tenderness or deformity.      Comments: ROM of hips ok.  Tight hamstrings.   Neurological:      General: No focal deficit present.      Mental Status: He is alert.   Psychiatric:         Mood and Affect: Mood normal.         Behavior: Behavior normal.         Thought Content: Thought content normal.         Judgment: Judgment normal.       Assessment/Plan   Problem List Items Addressed This Visit             ICD-10-CM    Pain in left leg - Primary M79.605     Pain improved but not resolved.  Completed PT as noted.  Otc advil as needed.  Rec ortho consult.           Relevant Orders    Referral to Orthopaedic Surgery          "

## 2024-09-10 NOTE — ASSESSMENT & PLAN NOTE
Pain improved but not resolved.  Completed PT as noted.  Otc advil as needed.  Rec ortho consult.

## 2024-11-04 ENCOUNTER — APPOINTMENT (OUTPATIENT)
Dept: HEMATOLOGY/ONCOLOGY | Facility: CLINIC | Age: 69
End: 2024-11-04
Payer: MEDICARE

## 2024-11-05 ENCOUNTER — TELEPHONE (OUTPATIENT)
Dept: HEMATOLOGY/ONCOLOGY | Facility: CLINIC | Age: 69
End: 2024-11-05
Payer: MEDICARE

## 2024-11-05 ENCOUNTER — APPOINTMENT (OUTPATIENT)
Dept: HEMATOLOGY/ONCOLOGY | Facility: CLINIC | Age: 69
End: 2024-11-05
Payer: MEDICARE

## 2024-11-05 DIAGNOSIS — D47.2 MGUS (MONOCLONAL GAMMOPATHY OF UNKNOWN SIGNIFICANCE): ICD-10-CM

## 2024-11-08 ENCOUNTER — LAB (OUTPATIENT)
Dept: LAB | Facility: LAB | Age: 69
End: 2024-11-08
Payer: MEDICARE

## 2024-11-08 DIAGNOSIS — D47.2 MGUS (MONOCLONAL GAMMOPATHY OF UNKNOWN SIGNIFICANCE): ICD-10-CM

## 2024-11-08 PROCEDURE — 36415 COLL VENOUS BLD VENIPUNCTURE: CPT

## 2024-11-08 PROCEDURE — 86334 IMMUNOFIX E-PHORESIS SERUM: CPT

## 2024-11-08 PROCEDURE — 84155 ASSAY OF PROTEIN SERUM: CPT

## 2024-11-08 PROCEDURE — 84165 PROTEIN E-PHORESIS SERUM: CPT

## 2024-11-09 LAB — PROT SERPL-MCNC: 6.5 G/DL (ref 6.4–8.2)

## 2024-11-11 ENCOUNTER — APPOINTMENT (OUTPATIENT)
Dept: HEMATOLOGY/ONCOLOGY | Facility: CLINIC | Age: 69
End: 2024-11-11
Payer: MEDICARE

## 2024-11-11 DIAGNOSIS — D47.2 MGUS (MONOCLONAL GAMMOPATHY OF UNKNOWN SIGNIFICANCE): Primary | ICD-10-CM

## 2024-11-13 ENCOUNTER — TELEPHONE (OUTPATIENT)
Dept: HEMATOLOGY/ONCOLOGY | Facility: CLINIC | Age: 69
End: 2024-11-13
Payer: MEDICARE

## 2024-11-13 DIAGNOSIS — D47.2 MGUS (MONOCLONAL GAMMOPATHY OF UNKNOWN SIGNIFICANCE): Primary | ICD-10-CM

## 2024-11-13 LAB
ALBUMIN: 4 G/DL (ref 3.4–5)
ALPHA 1 GLOBULIN: 0.2 G/DL (ref 0.2–0.6)
ALPHA 2 GLOBULIN: 0.6 G/DL (ref 0.4–1.1)
BETA GLOBULIN: 1 G/DL (ref 0.5–1.2)
GAMMA GLOBULIN: 0.7 G/DL (ref 0.5–1.4)
IMMUNOFIXATION COMMENT: ABNORMAL
M-PROTEIN 1: 0.3 G/DL
PATH REVIEW - SERUM IMMUNOFIXATION: ABNORMAL
PATH REVIEW-SERUM PROTEIN ELECTROPHORESIS: ABNORMAL
PROTEIN ELECTROPHORESIS COMMENT: ABNORMAL

## 2024-11-13 NOTE — TELEPHONE ENCOUNTER
RN spoke with  [facility] unsure what this message entails.  It looks like this order was added 11/11 and labs were drawn on 11/8.  Call placed to patient.  Patient notified of need for re-draw and will come to [facility] tomorrow.  Noted current orders in the system.          Curtis,   Are you able to ask the patient if he is willing to get re-drawn?   ----- Message -----   From: Kate Reeves RN   Sent: 11/13/2024   2:06 PM EST   To: TALON Monet-CNP   Subject: FW: Order for TSERINGJANEL                           ----- Message -----   From: Anastacia Sandoval   Sent: 11/11/2024   1:47 PM EST   To: [facility] Rn Care Coordinator - [place]   Subject: Order for JANEL CAGLE                               Outpatient order was rejected. Please call patient back for re-draw.   Patient Name: RANJITHJANEL LOPEZ([id number])

## 2024-11-14 ENCOUNTER — LAB (OUTPATIENT)
Dept: LAB | Facility: CLINIC | Age: 69
End: 2024-11-14
Payer: MEDICARE

## 2024-11-14 ENCOUNTER — TELEPHONE (OUTPATIENT)
Dept: HEMATOLOGY/ONCOLOGY | Facility: CLINIC | Age: 69
End: 2024-11-14
Payer: MEDICARE

## 2024-11-14 DIAGNOSIS — D47.2 MGUS (MONOCLONAL GAMMOPATHY OF UNKNOWN SIGNIFICANCE): ICD-10-CM

## 2024-11-14 LAB
BASOPHILS # BLD AUTO: 0.04 X10*3/UL (ref 0–0.1)
BASOPHILS NFR BLD AUTO: 1 %
EOSINOPHIL # BLD AUTO: 0.18 X10*3/UL (ref 0–0.7)
EOSINOPHIL NFR BLD AUTO: 4.6 %
ERYTHROCYTE [DISTWIDTH] IN BLOOD BY AUTOMATED COUNT: 13.7 % (ref 11.5–14.5)
HCT VFR BLD AUTO: 40.3 % (ref 41–52)
HGB BLD-MCNC: 13.6 G/DL (ref 13.5–17.5)
IMM GRANULOCYTES # BLD AUTO: 0 X10*3/UL (ref 0–0.7)
IMM GRANULOCYTES NFR BLD AUTO: 0 % (ref 0–0.9)
LYMPHOCYTES # BLD AUTO: 0.96 X10*3/UL (ref 1.2–4.8)
LYMPHOCYTES NFR BLD AUTO: 24.3 %
MCH RBC QN AUTO: 31.6 PG (ref 26–34)
MCHC RBC AUTO-ENTMCNC: 33.7 G/DL (ref 32–36)
MCV RBC AUTO: 94 FL (ref 80–100)
MONOCYTES # BLD AUTO: 0.31 X10*3/UL (ref 0.1–1)
MONOCYTES NFR BLD AUTO: 7.8 %
NEUTROPHILS # BLD AUTO: 2.46 X10*3/UL (ref 1.2–7.7)
NEUTROPHILS NFR BLD AUTO: 62.3 %
NRBC BLD-RTO: ABNORMAL /100{WBCS}
PLATELET # BLD AUTO: 172 X10*3/UL (ref 150–450)
RBC # BLD AUTO: 4.3 X10*6/UL (ref 4.5–5.9)
WBC # BLD AUTO: 4 X10*3/UL (ref 4.4–11.3)

## 2024-11-14 PROCEDURE — 86320 SERUM IMMUNOELECTROPHORESIS: CPT | Performed by: STUDENT IN AN ORGANIZED HEALTH CARE EDUCATION/TRAINING PROGRAM

## 2024-11-14 PROCEDURE — 85025 COMPLETE CBC W/AUTO DIFF WBC: CPT

## 2024-11-14 PROCEDURE — 83521 IG LIGHT CHAINS FREE EACH: CPT

## 2024-11-14 PROCEDURE — 86334 IMMUNOFIX E-PHORESIS SERUM: CPT

## 2024-11-14 PROCEDURE — 82784 ASSAY IGA/IGD/IGG/IGM EACH: CPT

## 2024-11-14 PROCEDURE — 84165 PROTEIN E-PHORESIS SERUM: CPT | Performed by: STUDENT IN AN ORGANIZED HEALTH CARE EDUCATION/TRAINING PROGRAM

## 2024-11-14 PROCEDURE — 80053 COMPREHEN METABOLIC PANEL: CPT

## 2024-11-14 PROCEDURE — 36415 COLL VENOUS BLD VENIPUNCTURE: CPT

## 2024-11-14 PROCEDURE — 84155 ASSAY OF PROTEIN SERUM: CPT

## 2024-11-15 LAB
ALBUMIN SERPL BCP-MCNC: 4.1 G/DL (ref 3.4–5)
ALP SERPL-CCNC: 50 U/L (ref 33–136)
ALT SERPL W P-5'-P-CCNC: 14 U/L (ref 10–52)
ANION GAP SERPL CALC-SCNC: 18 MMOL/L (ref 10–20)
AST SERPL W P-5'-P-CCNC: 13 U/L (ref 9–39)
BILIRUB SERPL-MCNC: 0.6 MG/DL (ref 0–1.2)
BUN SERPL-MCNC: 19 MG/DL (ref 6–23)
CALCIUM SERPL-MCNC: 9.3 MG/DL (ref 8.6–10.6)
CHLORIDE SERPL-SCNC: 103 MMOL/L (ref 98–107)
CO2 SERPL-SCNC: 24 MMOL/L (ref 21–32)
CREAT SERPL-MCNC: 1 MG/DL (ref 0.5–1.3)
EGFRCR SERPLBLD CKD-EPI 2021: 81 ML/MIN/1.73M*2
GLUCOSE SERPL-MCNC: 75 MG/DL (ref 74–99)
IGA SERPL-MCNC: 440 MG/DL (ref 70–400)
IGG SERPL-MCNC: 773 MG/DL (ref 700–1600)
IGM SERPL-MCNC: 50 MG/DL (ref 40–230)
KAPPA LC SERPL-MCNC: 17.85 MG/DL (ref 0.33–1.94)
KAPPA LC/LAMBDA SER: 13.95 {RATIO} (ref 0.26–1.65)
LAMBDA LC SERPL-MCNC: 1.28 MG/DL (ref 0.57–2.63)
POTASSIUM SERPL-SCNC: 5.2 MMOL/L (ref 3.5–5.3)
PROT SERPL-MCNC: 6.4 G/DL (ref 6.4–8.2)
PROT SERPL-MCNC: 6.4 G/DL (ref 6.4–8.2)
SODIUM SERPL-SCNC: 140 MMOL/L (ref 136–145)

## 2024-11-18 ENCOUNTER — TELEMEDICINE (OUTPATIENT)
Dept: HEMATOLOGY/ONCOLOGY | Facility: CLINIC | Age: 69
End: 2024-11-18
Payer: MEDICARE

## 2024-11-18 DIAGNOSIS — D47.2 MGUS (MONOCLONAL GAMMOPATHY OF UNKNOWN SIGNIFICANCE): Primary | ICD-10-CM

## 2024-11-18 DIAGNOSIS — M48.061 SPINAL STENOSIS OF LUMBAR REGION WITHOUT NEUROGENIC CLAUDICATION: ICD-10-CM

## 2024-11-18 LAB
ALBUMIN: 3.9 G/DL (ref 3.4–5)
ALPHA 1 GLOBULIN: 0.2 G/DL (ref 0.2–0.6)
ALPHA 2 GLOBULIN: 0.6 G/DL (ref 0.4–1.1)
BETA GLOBULIN: 1 G/DL (ref 0.5–1.2)
GAMMA GLOBULIN: 0.7 G/DL (ref 0.5–1.4)
IMMUNOFIXATION COMMENT: ABNORMAL
M-PROTEIN 1: 0.3 G/DL
PATH REVIEW - SERUM IMMUNOFIXATION: ABNORMAL
PATH REVIEW-SERUM PROTEIN ELECTROPHORESIS: ABNORMAL
PROTEIN ELECTROPHORESIS COMMENT: ABNORMAL

## 2024-11-18 PROCEDURE — 99443 PR PHYS/QHP TELEPHONE EVALUATION 21-30 MIN: CPT

## 2024-11-18 ASSESSMENT — ENCOUNTER SYMPTOMS
GASTROINTESTINAL NEGATIVE: 1
HEMATOLOGIC/LYMPHATIC NEGATIVE: 1
ENDOCRINE NEGATIVE: 1
ALLERGIC/IMMUNOLOGIC NEGATIVE: 1
PSYCHIATRIC NEGATIVE: 1
CONSTITUTIONAL NEGATIVE: 1
MYALGIAS: 1
RESPIRATORY NEGATIVE: 1
CARDIOVASCULAR NEGATIVE: 1
EYES NEGATIVE: 1
NEUROLOGICAL NEGATIVE: 1

## 2024-11-18 NOTE — PROGRESS NOTES
Patient ID: Bib Dai is a 69 y.o. male.  Referring Physician: No referring provider defined for this encounter.  Primary Care Provider: Miguel Elizondo MD  Date of Service:  11/18/2024    Virtual or Telephone Consent    A telephone visit (audio only) between the patient (at the originating site) and the provider (at the distant site) was utilized to provide this telehealth service.   Verbal consent was requested and obtained from Bib Dai on this date, 11/18/24 for a telehealth visit.     IgA [place] MGUS:    Mr. Bib Dai is a 69 year old male. Has a past medical history of Moderate aortic regurgitation, aortic enlg, HLP, OA, PreDM, and arthritis . Presents as a referral from his PCP Dr. Elizondo for a positive SPEP found during workup for leukopenia.     Workup:  SPEP (11/11/21): 0.3g/dL IgA Kappa M protein  SFLC (12/1/21): Kappa FLC 11.13mg/dL, Lambda FLC 1.31mg/dL, FLC ratio 8.50  Immunoglobulins (11/11/21): IgA 455  24 hour urine (12/7/21): 43.1% or 45.7mg/24 hours Kappa FLC  Osseous survey (12/3/21): Negative for lytic lesions   Flow cytometery (11/14/21): Negative   BMBx (4/29/24):  A, B & C. BONE MARROW CLOT WITH ASPIRATE AND CORE WITH TOUCH PREP, LEFT ILIAC CREST:   --  NORMOCELLULAR BONE MARROW (30%) WITH MATURING TRILINEAGE HEMATOPOIESIS.  --  APPROXIMATELY 6-8% KAPPA-RESTRICTED PLASMA CELLS CONSISTENT WITH PLASMA CELL NEOPLASM, SEE NOTE.     NOTE: A population of clonal plasma cells (CD19-, CD56+/bright, CD27+/moderate, CD45-/dim, kappa-restricted), was detected by flow cytometry. There was morphological evidence of these cells, which were enumerated at 5.5% in the aspirate smear. 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 Clinical and radiological correlation is recommended.      PMH: Moderate aortic regurgitation, aortic enlg, HLP, OA, PreDM, arthritis     SH: Diet - reg, Denies Tobacco, Rec drug use, or Radiation exposure. Occ etoh.   by [employer]. Originally from [place]  (has allergy and has epi pen).     FH: DM, HTN; daughter with tubular sclerosis; arthritis; alzheimer's     SUBJECTIVE:  History of Present Illness:  Bib presents to clinic 11/18/24 for a follow up phone call.     Since our last visit his L hip pain has 95% improved. He did PT and did not really see much of a difference but over time his body has healed. He did follow up with ortho.    Otherwise he feels well.       Review of Systems   Constitutional: Negative.    HENT: Negative.     Eyes: Negative.    Respiratory: Negative.     Cardiovascular: Negative.    Gastrointestinal: Negative.    Endocrine: Negative.    Genitourinary: Negative.    Musculoskeletal:  Positive for myalgias.   Skin: Negative.    Allergic/Immunologic: Negative.    Neurological: Negative.    Hematological: Negative.    Psychiatric/Behavioral: Negative.       OBJECTIVE:  KPS: Karnofsky Score: 90 - Able to carry on normal activity; minor signs or symptoms of disease    VS:  There were no vitals taken for this visit.  BSA: There is no height or weight on file to calculate BSA.    Laboratory:  The pertinent laboratory results were reviewed and discussed with the patient.    Lab Results   Component Value Date    WBC 4.0 (L) 11/14/2024    HCT 40.3 (L) 11/14/2024    HGB 13.6 11/14/2024     11/14/2024    K 5.2 11/14/2024    CALCIUM 9.3 11/14/2024     11/14/2024    ALT 14 11/14/2024    AST 13 11/14/2024    BUN 19 11/14/2024    CREATININE 1.00 11/14/2024    KAPPA 17.85 (H) 11/14/2024    LAMBDA 1.28 11/14/2024    KAPLS 13.95 (H) 11/14/2024    SPEP Aberrant band detected. See immunofixation. 11/08/2024    IEPIN  11/08/2024 11/8/24  Known monoclonal IgA kappa in the beta region at 0.3 g/dL. Unchanged from the previous analysis on 4/29/24.     11/14/2024    IGM 50 11/14/2024     (H) 11/14/2024      Note: for a comprehensive list of the patient's lab results, access the  Results Review activity.    ASSESSMENT and PLAN:    MGUS:  - Former Michelle Mcpherson, CNP patient  - 0.3g/dL IgA Kappa M protein  - Initially Kappa FLC 11.13mg/dL, FLC ratio 8.50  - IgA 455  - 24 hour UPEP showed 43.1% or 45.7mg/24 hours Kappa FLC   - Negative osseous survey  - Increase in FLC ratio over past couple years (currently 15.99 in 11/2023)  - 24 hour urine showed 120mg/24 hour Kappa FLC, 58.8% of total urine protein  - No overt SLiM CRAB criteria  - BMBx performed today 4/29/24, showed 6-8% plasma cells, consistent with MGUS  - L hip pain since BMBx, ordered L hip x-ray to rule out acute cause, may be musculoskeletal vs. Neuropathic, negative xray, did PT and followed with ortho, has 95% resolved    RTC:  6 months w/ labs prior     I spent 23 minutes reviewing, planning, and counseling the pt.    TALON Monet-CNP

## 2025-02-17 DIAGNOSIS — Z12.5 PROSTATE CANCER SCREENING: ICD-10-CM

## 2025-02-17 DIAGNOSIS — R73.03 PREDIABETES: ICD-10-CM

## 2025-02-17 DIAGNOSIS — D47.2 MGUS (MONOCLONAL GAMMOPATHY OF UNKNOWN SIGNIFICANCE): ICD-10-CM

## 2025-02-17 DIAGNOSIS — E78.00 HYPERCHOLESTEROLEMIA: Primary | ICD-10-CM

## 2025-03-05 LAB
ALBUMIN SERPL-MCNC: 4.2 G/DL (ref 3.6–5.1)
ALP SERPL-CCNC: 48 U/L (ref 35–144)
ALT SERPL-CCNC: 17 U/L (ref 9–46)
ANION GAP SERPL CALCULATED.4IONS-SCNC: 7 MMOL/L (CALC) (ref 7–17)
APPEARANCE UR: CLEAR
AST SERPL-CCNC: 14 U/L (ref 10–35)
BASOPHILS # BLD AUTO: 52 CELLS/UL (ref 0–200)
BASOPHILS NFR BLD AUTO: 1.1 %
BILIRUB SERPL-MCNC: 0.7 MG/DL (ref 0.2–1.2)
BILIRUB UR QL STRIP: NEGATIVE
BUN SERPL-MCNC: 15 MG/DL (ref 7–25)
CALCIUM SERPL-MCNC: 9.4 MG/DL (ref 8.6–10.3)
CHLORIDE SERPL-SCNC: 103 MMOL/L (ref 98–110)
CHOLEST SERPL-MCNC: 216 MG/DL
CHOLEST/HDLC SERPL: 5 (CALC)
CO2 SERPL-SCNC: 31 MMOL/L (ref 20–32)
COLOR UR: YELLOW
CREAT SERPL-MCNC: 1.01 MG/DL (ref 0.7–1.28)
EGFRCR SERPLBLD CKD-EPI 2021: 80 ML/MIN/1.73M2
EOSINOPHIL # BLD AUTO: 329 CELLS/UL (ref 15–500)
EOSINOPHIL NFR BLD AUTO: 7 %
ERYTHROCYTE [DISTWIDTH] IN BLOOD BY AUTOMATED COUNT: 13 % (ref 11–15)
EST. AVERAGE GLUCOSE BLD GHB EST-MCNC: 120 MG/DL
EST. AVERAGE GLUCOSE BLD GHB EST-SCNC: 6.6 MMOL/L
GLUCOSE SERPL-MCNC: 125 MG/DL (ref 65–139)
GLUCOSE UR QL STRIP: NEGATIVE
HBA1C MFR BLD: 5.8 % OF TOTAL HGB
HCT VFR BLD AUTO: 43.3 % (ref 38.5–50)
HDLC SERPL-MCNC: 43 MG/DL
HGB BLD-MCNC: 14.4 G/DL (ref 13.2–17.1)
HGB UR QL STRIP: NEGATIVE
KETONES UR QL STRIP: NEGATIVE
LDLC SERPL CALC-MCNC: 149 MG/DL (CALC)
LEUKOCYTE ESTERASE UR QL STRIP: NEGATIVE
LYMPHOCYTES # BLD AUTO: 1015 CELLS/UL (ref 850–3900)
LYMPHOCYTES NFR BLD AUTO: 21.6 %
MCH RBC QN AUTO: 31.4 PG (ref 27–33)
MCHC RBC AUTO-ENTMCNC: 33.3 G/DL (ref 32–36)
MCV RBC AUTO: 94.3 FL (ref 80–100)
MONOCYTES # BLD AUTO: 277 CELLS/UL (ref 200–950)
MONOCYTES NFR BLD AUTO: 5.9 %
NEUTROPHILS # BLD AUTO: 3027 CELLS/UL (ref 1500–7800)
NEUTROPHILS NFR BLD AUTO: 64.4 %
NITRITE UR QL STRIP: NEGATIVE
NONHDLC SERPL-MCNC: 173 MG/DL (CALC)
PH UR STRIP: 8 [PH] (ref 5–8)
PLATELET # BLD AUTO: 187 THOUSAND/UL (ref 140–400)
PMV BLD REES-ECKER: 11.6 FL (ref 7.5–12.5)
POTASSIUM SERPL-SCNC: 5.5 MMOL/L (ref 3.5–5.3)
PROT SERPL-MCNC: 6.5 G/DL (ref 6.1–8.1)
PROT UR QL STRIP: NEGATIVE
PSA SERPL-MCNC: 4.2 NG/ML
RBC # BLD AUTO: 4.59 MILLION/UL (ref 4.2–5.8)
SODIUM SERPL-SCNC: 141 MMOL/L (ref 135–146)
SP GR UR STRIP: 1.02 (ref 1–1.03)
TRIGL SERPL-MCNC: 120 MG/DL
TSH SERPL-ACNC: 1.67 MIU/L (ref 0.4–4.5)
WBC # BLD AUTO: 4.7 THOUSAND/UL (ref 3.8–10.8)

## 2025-03-10 ENCOUNTER — APPOINTMENT (OUTPATIENT)
Dept: PRIMARY CARE | Facility: CLINIC | Age: 70
End: 2025-03-10
Payer: MEDICARE

## 2025-03-10 VITALS
WEIGHT: 200 LBS | SYSTOLIC BLOOD PRESSURE: 128 MMHG | HEART RATE: 69 BPM | OXYGEN SATURATION: 97 % | DIASTOLIC BLOOD PRESSURE: 70 MMHG | HEIGHT: 67 IN | BODY MASS INDEX: 31.39 KG/M2

## 2025-03-10 DIAGNOSIS — R73.03 PREDIABETES: ICD-10-CM

## 2025-03-10 DIAGNOSIS — R97.20 ELEVATED PSA, LESS THAN 10 NG/ML: ICD-10-CM

## 2025-03-10 DIAGNOSIS — E78.00 HYPERCHOLESTEROLEMIA: ICD-10-CM

## 2025-03-10 DIAGNOSIS — L98.9 SKIN LESIONS: ICD-10-CM

## 2025-03-10 DIAGNOSIS — Z00.00 ROUTINE GENERAL MEDICAL EXAMINATION AT HEALTH CARE FACILITY: Primary | ICD-10-CM

## 2025-03-10 DIAGNOSIS — D47.2 MGUS (MONOCLONAL GAMMOPATHY OF UNKNOWN SIGNIFICANCE): ICD-10-CM

## 2025-03-10 PROCEDURE — 1160F RVW MEDS BY RX/DR IN RCRD: CPT | Performed by: INTERNAL MEDICINE

## 2025-03-10 PROCEDURE — G0439 PPPS, SUBSEQ VISIT: HCPCS | Performed by: INTERNAL MEDICINE

## 2025-03-10 PROCEDURE — 1159F MED LIST DOCD IN RCRD: CPT | Performed by: INTERNAL MEDICINE

## 2025-03-10 PROCEDURE — 1036F TOBACCO NON-USER: CPT | Performed by: INTERNAL MEDICINE

## 2025-03-10 PROCEDURE — 3008F BODY MASS INDEX DOCD: CPT | Performed by: INTERNAL MEDICINE

## 2025-03-10 PROCEDURE — 99214 OFFICE O/P EST MOD 30 MIN: CPT | Performed by: INTERNAL MEDICINE

## 2025-03-10 PROCEDURE — 1170F FXNL STATUS ASSESSED: CPT | Performed by: INTERNAL MEDICINE

## 2025-03-10 ASSESSMENT — ENCOUNTER SYMPTOMS
ABDOMINAL PAIN: 0
SHORTNESS OF BREATH: 0
ARTHRALGIAS: 1
DIZZINESS: 0
FATIGUE: 0

## 2025-03-10 ASSESSMENT — ACTIVITIES OF DAILY LIVING (ADL)
DOING_HOUSEWORK: INDEPENDENT
MANAGING_FINANCES: INDEPENDENT
TAKING_MEDICATION: INDEPENDENT
DRESSING: INDEPENDENT
GROCERY_SHOPPING: INDEPENDENT
BATHING: INDEPENDENT

## 2025-03-10 ASSESSMENT — PATIENT HEALTH QUESTIONNAIRE - PHQ9
2. FEELING DOWN, DEPRESSED OR HOPELESS: NOT AT ALL
1. LITTLE INTEREST OR PLEASURE IN DOING THINGS: NOT AT ALL
SUM OF ALL RESPONSES TO PHQ9 QUESTIONS 1 AND 2: 0

## 2025-03-10 NOTE — PROGRESS NOTES
"Subjective   Reason for Visit: Bib Dai is an 69 y.o. male here for a Medicare Wellness visit and chronic medical problems.    Past Medical, Surgical, and Family History reviewed and updated in chart.    Reviewed all medications by prescribing practitioner or clinical pharmacist (such as prescriptions, OTCs, herbal therapies and supplements) and documented in the medical record.    Doing much better now,  Physical activities increase \"95%\" past 8 months.  Completed PT with transition to HEP.  Now working with weights.  Bone survey and left hip xrays negative.  Xrays L spine shows OA.  MRI remote showed L spinal stenosis.  Still has periodic pain in left anterior thigh.  Sleeps fairly well at night.  Brother recently had CABG.  Meds and labs reviewed.        Patient Care Team:  Miguel Elizondo MD as PCP - General  Miguel Elizondo MD as PCP - [employer] PCP     Review of Systems   Constitutional:  Negative for fatigue.   Respiratory:  Negative for shortness of breath.    Cardiovascular:  Negative for chest pain.   Gastrointestinal:  Negative for abdominal pain.   Musculoskeletal:  Positive for arthralgias.   Neurological:  Negative for dizziness.       Objective   Vitals:  /70 (BP Location: Right arm, Patient Position: Sitting)   Pulse 69   Ht 1.702 m (5' 7\")   Wt 90.7 kg (200 lb)   SpO2 97%   BMI 31.32 kg/m²       Physical Exam  Constitutional:       Appearance: Normal appearance.   Neck:      Vascular: No carotid bruit.   Cardiovascular:      Rate and Rhythm: Normal rate and regular rhythm.      Pulses: Normal pulses.      Heart sounds: Normal heart sounds.   Pulmonary:      Effort: Pulmonary effort is normal.      Breath sounds: Normal breath sounds.   Abdominal:      General: Abdomen is flat. Bowel sounds are normal.      Palpations: Abdomen is soft.   Neurological:      General: No focal deficit present.      Mental Status: He is alert.   Psychiatric:         Mood and Affect: " Mood normal.         Behavior: Behavior normal.         Thought Content: Thought content normal.         Judgment: Judgment normal.         Assessment & Plan  Routine general medical examination at health care facility    Orders:    1 Year Follow Up In Primary Care - Wellness Exam; Future    Prediabetes  A1c 5.8 with diet.    Orders:    CT cardiac scoring wo IV contrast; Future    Elevated PSA, less than 10 ng/ml  Rec urology consult.    Orders:    Referral to Urology; Future    Hypercholesterolemia  Lipids, LDL not at goal without statin.  Recommend low fat/cholesterol diet.    Orders:    CT cardiac scoring wo IV contrast; Future    MGUS (monoclonal gammopathy of unknown significance)  Condition andres.  Followed with hematology.     Skin lesions  Rec derm fu.    Orders:    Referral to Dermatology

## 2025-03-10 NOTE — ASSESSMENT & PLAN NOTE
Lipids, LDL not at goal without statin.  Recommend low fat/cholesterol diet.    Orders:    CT cardiac scoring wo IV contrast; Future

## 2025-03-18 DIAGNOSIS — R97.20 ELEVATED PSA: Primary | ICD-10-CM

## 2025-04-08 ENCOUNTER — HOSPITAL ENCOUNTER (OUTPATIENT)
Dept: RADIOLOGY | Facility: CLINIC | Age: 70
Discharge: HOME | End: 2025-04-08
Payer: MEDICARE

## 2025-04-08 DIAGNOSIS — R97.20 ELEVATED PSA: ICD-10-CM

## 2025-04-08 PROCEDURE — 2550000001 HC RX 255 CONTRASTS: Performed by: STUDENT IN AN ORGANIZED HEALTH CARE EDUCATION/TRAINING PROGRAM

## 2025-04-08 PROCEDURE — 72197 MRI PELVIS W/O & W/DYE: CPT | Performed by: RADIOLOGY

## 2025-04-08 PROCEDURE — A9575 INJ GADOTERATE MEGLUMI 0.1ML: HCPCS | Performed by: STUDENT IN AN ORGANIZED HEALTH CARE EDUCATION/TRAINING PROGRAM

## 2025-04-08 PROCEDURE — 72197 MRI PELVIS W/O & W/DYE: CPT

## 2025-04-08 RX ORDER — GADOTERATE MEGLUMINE 376.9 MG/ML
0.2 INJECTION INTRAVENOUS
Status: COMPLETED | OUTPATIENT
Start: 2025-04-08 | End: 2025-04-08

## 2025-04-08 RX ADMIN — GADOTERATE MEGLUMINE 17.5 ML: 376.9 INJECTION INTRAVENOUS at 10:47

## 2025-04-12 LAB — PSA SERPL-MCNC: 4.07 NG/ML

## 2025-05-05 ENCOUNTER — APPOINTMENT (OUTPATIENT)
Dept: PRIMARY CARE | Facility: CLINIC | Age: 70
End: 2025-05-05
Payer: MEDICARE

## 2025-05-06 ENCOUNTER — LAB (OUTPATIENT)
Dept: LAB | Facility: CLINIC | Age: 70
End: 2025-05-06
Payer: MEDICARE

## 2025-05-06 DIAGNOSIS — D47.2 MGUS (MONOCLONAL GAMMOPATHY OF UNKNOWN SIGNIFICANCE): ICD-10-CM

## 2025-05-06 LAB
BASOPHILS # BLD AUTO: 0.04 X10*3/UL (ref 0–0.1)
BASOPHILS NFR BLD AUTO: 0.9 %
EOSINOPHIL # BLD AUTO: 0.22 X10*3/UL (ref 0–0.4)
EOSINOPHIL NFR BLD AUTO: 5.1 %
ERYTHROCYTE [DISTWIDTH] IN BLOOD BY AUTOMATED COUNT: 14.3 % (ref 11.5–14.5)
HCT VFR BLD AUTO: 42.6 % (ref 41–52)
HGB BLD-MCNC: 14.2 G/DL (ref 13.5–17.5)
IMM GRANULOCYTES # BLD AUTO: 0 X10*3/UL (ref 0–0.5)
IMM GRANULOCYTES NFR BLD AUTO: 0 % (ref 0–0.9)
LYMPHOCYTES # BLD AUTO: 1.03 X10*3/UL (ref 0.8–3)
LYMPHOCYTES NFR BLD AUTO: 23.7 %
MCH RBC QN AUTO: 31.5 PG (ref 26–34)
MCHC RBC AUTO-ENTMCNC: 33.3 G/DL (ref 32–36)
MCV RBC AUTO: 95 FL (ref 80–100)
MONOCYTES # BLD AUTO: 0.33 X10*3/UL (ref 0.05–0.8)
MONOCYTES NFR BLD AUTO: 7.6 %
NEUTROPHILS # BLD AUTO: 2.73 X10*3/UL (ref 1.6–5.5)
NEUTROPHILS NFR BLD AUTO: 62.7 %
NRBC BLD-RTO: NORMAL /100{WBCS}
PLATELET # BLD AUTO: 206 X10*3/UL (ref 150–450)
RBC # BLD AUTO: 4.51 X10*6/UL (ref 4.5–5.9)
WBC # BLD AUTO: 4.4 X10*3/UL (ref 4.4–11.3)

## 2025-05-06 PROCEDURE — 82784 ASSAY IGA/IGD/IGG/IGM EACH: CPT

## 2025-05-06 PROCEDURE — 36415 COLL VENOUS BLD VENIPUNCTURE: CPT

## 2025-05-06 PROCEDURE — 80053 COMPREHEN METABOLIC PANEL: CPT

## 2025-05-06 PROCEDURE — 83521 IG LIGHT CHAINS FREE EACH: CPT

## 2025-05-06 PROCEDURE — 85025 COMPLETE CBC W/AUTO DIFF WBC: CPT

## 2025-05-07 LAB
ALBUMIN SERPL BCP-MCNC: 4.2 G/DL (ref 3.4–5)
ALP SERPL-CCNC: 53 U/L (ref 33–136)
ALT SERPL W P-5'-P-CCNC: 22 U/L (ref 10–52)
ANION GAP SERPL CALC-SCNC: 13 MMOL/L (ref 10–20)
AST SERPL W P-5'-P-CCNC: 14 U/L (ref 9–39)
BILIRUB SERPL-MCNC: 0.6 MG/DL (ref 0–1.2)
BUN SERPL-MCNC: 24 MG/DL (ref 6–23)
CALCIUM SERPL-MCNC: 9.6 MG/DL (ref 8.6–10.6)
CHLORIDE SERPL-SCNC: 104 MMOL/L (ref 98–107)
CO2 SERPL-SCNC: 28 MMOL/L (ref 21–32)
CREAT SERPL-MCNC: 0.93 MG/DL (ref 0.5–1.3)
EGFRCR SERPLBLD CKD-EPI 2021: 88 ML/MIN/1.73M*2
GLUCOSE SERPL-MCNC: 100 MG/DL (ref 74–99)
IGA SERPL-MCNC: 512 MG/DL (ref 70–400)
IGG SERPL-MCNC: 795 MG/DL (ref 700–1600)
IGM SERPL-MCNC: 60 MG/DL (ref 40–230)
KAPPA LC SERPL-MCNC: 19.27 MG/DL (ref 0.33–1.94)
KAPPA LC/LAMBDA SER: 18.01 {RATIO} (ref 0.26–1.65)
LAMBDA LC SERPL-MCNC: 1.07 MG/DL (ref 0.57–2.63)
POTASSIUM SERPL-SCNC: 5 MMOL/L (ref 3.5–5.3)
PROT SERPL-MCNC: 6.8 G/DL (ref 6.4–8.2)
SODIUM SERPL-SCNC: 140 MMOL/L (ref 136–145)

## 2025-05-13 ENCOUNTER — TELEPHONE (OUTPATIENT)
Dept: PRIMARY CARE | Facility: CLINIC | Age: 70
End: 2025-05-13
Payer: MEDICARE

## 2025-05-13 NOTE — TELEPHONE ENCOUNTER
RB patient       Pt was at [facility] ed with abd pain, they dx him with kidney stones and also stated they saw a  mass on his pancreas when they did ct of abd, pt is requesting a mri for the mass on pancreas as instructed by Emergency dept, please advise

## 2025-05-14 ENCOUNTER — OFFICE VISIT (OUTPATIENT)
Dept: PRIMARY CARE | Facility: CLINIC | Age: 70
End: 2025-05-14
Payer: MEDICARE

## 2025-05-14 VITALS
BODY MASS INDEX: 31.04 KG/M2 | DIASTOLIC BLOOD PRESSURE: 70 MMHG | HEIGHT: 67 IN | SYSTOLIC BLOOD PRESSURE: 114 MMHG | OXYGEN SATURATION: 96 % | WEIGHT: 197.8 LBS | HEART RATE: 75 BPM

## 2025-05-14 DIAGNOSIS — K86.89 PANCREATIC MASS (HHS-HCC): Primary | ICD-10-CM

## 2025-05-14 PROCEDURE — 3008F BODY MASS INDEX DOCD: CPT | Performed by: NURSE PRACTITIONER

## 2025-05-14 PROCEDURE — 1124F ACP DISCUSS-NO DSCNMKR DOCD: CPT | Performed by: NURSE PRACTITIONER

## 2025-05-14 PROCEDURE — 1036F TOBACCO NON-USER: CPT | Performed by: NURSE PRACTITIONER

## 2025-05-14 PROCEDURE — 1159F MED LIST DOCD IN RCRD: CPT | Performed by: NURSE PRACTITIONER

## 2025-05-14 PROCEDURE — 99213 OFFICE O/P EST LOW 20 MIN: CPT | Performed by: NURSE PRACTITIONER

## 2025-05-14 RX ORDER — TAMSULOSIN HYDROCHLORIDE 0.4 MG/1
0.4 CAPSULE ORAL DAILY
COMMUNITY
Start: 2025-05-12 | End: 2025-05-19

## 2025-05-14 RX ORDER — OXYCODONE AND ACETAMINOPHEN 5; 325 MG/1; MG/1
1 TABLET ORAL EVERY 6 HOURS PRN
COMMUNITY
Start: 2025-05-12 | End: 2025-05-15

## 2025-05-14 RX ORDER — ONDANSETRON 4 MG/1
4 TABLET, ORALLY DISINTEGRATING ORAL EVERY 6 HOURS PRN
COMMUNITY
Start: 2025-05-12 | End: 2025-05-19

## 2025-05-14 ASSESSMENT — ENCOUNTER SYMPTOMS
RESPIRATORY NEGATIVE: 1
CARDIOVASCULAR NEGATIVE: 1
MUSCULOSKELETAL NEGATIVE: 1
DIARRHEA: 0
CONSTIPATION: 0
BLOOD IN STOOL: 0
ABDOMINAL PAIN: 1
NAUSEA: 0
HEMATOLOGIC/LYMPHATIC NEGATIVE: 1

## 2025-05-14 NOTE — PATIENT INSTRUCTIONS
MRI ordered.  Follow up with urologist as scheduled  I will keep Dr Elizondo informed as to new finding and MRI that was ordered.

## 2025-05-14 NOTE — PROGRESS NOTES
"Subjective   Patient ID: Bib Dai is a 70 y.o. male who presents for Hospital Follow-up (Pt went to the hospital for abdominal pain(kidney stones). Hospital did testing and they informed him that he needed additional testing. Mri order on pancreas and told him to see his pcp for the order. ).    HPI   Patient of Dr Elizondo here for acute concern/finding. Previously appt with Dr Elizondo on 03/10/2025  Current concern:  1) had been seen at ER ([facility]) on 05/12/2025 for abdominal pain, urinalysis:  trace blood and ketones, BMP slight increase creatinine (1.46)  and decrease eGFR (51) otherwise unremarkable   CT abd/pel w/IVC noted 5 mm calculus obstruct obstructs right ureterovesical mild right hydroureteronephrosis, right hydrocele  suspected 1.6 x 1.9 x 1.9 mass in pancreatic tail  with recommended MRI pancreas/biliary wo/w IV contrast   Today patient reports abdominal pain improved, controlled with Tylenol, not needing Percocet prescribed by ER.  No issues with urination. Denies change in weight, appetite, energy, BM, any nausea, or change in skin coloration.    Has follow up with urologist next week.    Chronic Concerns: anemia, ascending aorta enlargement, hypercholesterolemia, MGUS, moderate aortic regurgitation, prediabetes, spinal stenosis, BPH, elevated PSA  Specialist  - urology   - orthopedic  -hematology/oncology    - [facility] (2021 for colonoscopy)  is to have follow up 2026     Review of Systems   Respiratory: Negative.     Cardiovascular: Negative.    Gastrointestinal:  Positive for abdominal pain (more on right abdomen but much improved). Negative for blood in stool, constipation, diarrhea and nausea.   Genitourinary: Negative.    Musculoskeletal: Negative.    Skin: Negative.    Hematological: Negative.      Objective   /70 (BP Location: Right arm, Patient Position: Sitting, BP Cuff Size: Adult)   Pulse 75   Ht 1.7 m (5' 6.93\")   Wt 89.7 kg (197 lb 12.8 oz)   SpO2 96%   BMI 31.04 " kg/m²     Physical Exam  Vitals reviewed.   Constitutional:       General: He is not in acute distress.     Appearance: Normal appearance. He is well-developed. He is not ill-appearing.   Eyes:      General: Lids are normal.      Conjunctiva/sclera: Conjunctivae normal.   Pulmonary:      Effort: Pulmonary effort is normal.   Musculoskeletal:      Cervical back: Neck supple.   Skin:     General: Skin is warm.      Coloration: Skin is not jaundiced or pale.      Findings: No rash.   Neurological:      Mental Status: He is alert.      Motor: Motor function is intact.      Gait: Gait is intact.   Psychiatric:         Attention and Perception: Attention normal.         Behavior: Behavior normal. Behavior is cooperative.       Assessment/Plan   Diagnoses and all orders for this visit:  Pancreatic mass ([facility])  Patient reports has been feeling well until abdominal pain caused by kidney stone. Currently pain is much improved, he has follow up with urology next week  Advised to continue as currently doing with Tylenol for pain relief, push fluids. Advised that scheduling MRI sometimes slight delay d/t insurance review.  He had seen GI 2021 Dr Olivera and is to schedule next year repeat colonoscopy.  Pending MRI results a different GI referral may be necessary.  -     MR abdomen w and wo IV contrast; Future     PLAN: Patient would like to follow up with Dr Elizondo more often than once yearly

## 2025-05-23 ENCOUNTER — HOSPITAL ENCOUNTER (OUTPATIENT)
Dept: RADIOLOGY | Facility: HOSPITAL | Age: 70
Discharge: HOME | End: 2025-05-23
Payer: MEDICARE

## 2025-05-23 DIAGNOSIS — K86.89 PANCREATIC MASS (HHS-HCC): ICD-10-CM

## 2025-05-23 PROCEDURE — 74183 MRI ABD W/O CNTR FLWD CNTR: CPT

## 2025-05-23 PROCEDURE — 2550000001 HC RX 255 CONTRASTS: Performed by: NURSE PRACTITIONER

## 2025-05-23 PROCEDURE — A9575 INJ GADOTERATE MEGLUMI 0.1ML: HCPCS | Performed by: NURSE PRACTITIONER

## 2025-05-23 RX ORDER — GADOTERATE MEGLUMINE 376.9 MG/ML
18 INJECTION INTRAVENOUS
Status: COMPLETED | OUTPATIENT
Start: 2025-05-23 | End: 2025-05-23

## 2025-05-23 RX ADMIN — GADOTERATE MEGLUMINE 18 ML: 376.9 INJECTION INTRAVENOUS at 19:39

## 2025-05-29 ENCOUNTER — TELEPHONE (OUTPATIENT)
Dept: PRIMARY CARE | Facility: CLINIC | Age: 70
End: 2025-05-29
Payer: MEDICARE

## 2025-05-29 DIAGNOSIS — K86.89 PANCREATIC MASS (HHS-HCC): Primary | ICD-10-CM

## 2025-05-29 DIAGNOSIS — N20.1 RIGHT URETERAL CALCULUS: Primary | ICD-10-CM

## 2025-05-29 NOTE — TELEPHONE ENCOUNTER
----- Message from Wendy Schwab sent at 5/29/2025  7:36 AM EDT -----  Regarding: RE: pancreatic mass  Please let patient of Dr Elizondo know that MRI did show Solitary pancreatic neoplasm measures about 4.5 cm along the long axis of the gland.  (Patient has viewed these results on MY CHART)   Dr Elizondo advised patient to see General surgeon Dr Fritz Moreira who specializes in pancreatic masses.  Referral in EMR.     ----- Message -----  From: Miguel Elizondo MD  Sent: 5/28/2025   1:49 PM EDT  To: SOY Ramirez  Subject: RE: pancreatic mass                              Rec Dr Fritz Moreira, comes to this building, he specializes in pancreas  ----- Message -----  From: SOY Ramirez  Sent: 5/28/2025   1:31 PM EDT  To: Miguel Elizondo MD  Subject: pancreatic mass                                  Please advise regarding results:  MRI result- patient has Seen GI (Dr Olivera previously) not sure if that is who he should see for this new concern?   ----- Message -----  From: Interface, Radiology Results In  Sent: 5/28/2025  12:18 PM EDT  To: SOY Ramirez

## 2025-06-06 ENCOUNTER — HOSPITAL ENCOUNTER (OUTPATIENT)
Dept: RADIOLOGY | Facility: EXTERNAL LOCATION | Age: 70
Discharge: HOME | End: 2025-06-06

## 2025-06-15 ASSESSMENT — ENCOUNTER SYMPTOMS
EYES NEGATIVE: 1
NEUROLOGICAL NEGATIVE: 1
ALLERGIC/IMMUNOLOGIC NEGATIVE: 1
MYALGIAS: 1
ENDOCRINE NEGATIVE: 1
HEMATOLOGIC/LYMPHATIC NEGATIVE: 1
PSYCHIATRIC NEGATIVE: 1
GASTROINTESTINAL NEGATIVE: 1
RESPIRATORY NEGATIVE: 1
CONSTITUTIONAL NEGATIVE: 1
CARDIOVASCULAR NEGATIVE: 1

## 2025-06-15 NOTE — PROGRESS NOTES
Patient ID: Bib Dai is a 70 y.o. male.  Referring Physician: No referring provider defined for this encounter.  Primary Care Provider: Miguel Elizondo MD  Date of Service:  6/16/2025    IgA Kappa MGUS:    Mr. Bib Dai is a 69 year old male. Has a past medical history of Moderate aortic regurgitation, aortic enlg, HLP, OA, PreDM, and arthritis . Presents as a referral from his PCP Dr. Elizondo for a positive SPEP found during workup for leukopenia.     Workup:  SPEP (11/11/21): 0.3g/dL IgA Kappa M protein  SFLC (12/1/21): Kappa FLC 11.13mg/dL, Lambda FLC 1.31mg/dL, FLC ratio 8.50  Immunoglobulins (11/11/21): IgA 455  24 hour urine (12/7/21): 43.1% or 45.7mg/24 hours Kappa FLC  Osseous survey (12/3/21): Negative for lytic lesions   Flow cytometery (11/14/21): Negative   BMBx (4/29/24):  A, B & C. BONE MARROW CLOT WITH ASPIRATE AND CORE WITH TOUCH PREP, LEFT ILIAC CREST:   --  NORMOCELLULAR BONE MARROW (30%) WITH MATURING TRILINEAGE HEMATOPOIESIS.  --  APPROXIMATELY 6-8% KAPPA-RESTRICTED PLASMA CELLS CONSISTENT WITH PLASMA CELL NEOPLASM, SEE NOTE.     NOTE: A population of clonal plasma cells (CD19-, CD56+/bright, CD27+/moderate, CD45-/dim, kappa-restricted), was detected by flow cytometry. There was morphological evidence of these cells, which were enumerated at 5.5% in the aspirate smear. 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 Clinical and radiological correlation is recommended.      PMH: Moderate aortic regurgitation, aortic enlg, HLP, OA, PreDM, arthritis     SH: Diet - reg, Denies Tobacco, Rec drug use, or Radiation exposure. Occ etoh.  by trade. Originally from [place].  (has allergy and has epi pen).     FH: DM, HTN; daughter with tubular sclerosis; arthritis; alzheimer's     SUBJECTIVE:  History of Present Illness:  Bib presents to clinic 6/17/25 for a follow up visit with his wife Serene.     Overall he is feeling well.      They found a pancreatic mass concerning for neuroendocrine tumor. Has follow up to potentially plan biopsy.     PSA was uptrending, had prostate MRI. Benign as well. Continue to monitor.     Went to dentist. White spot on gums. Biopsy on gums.       Review of Systems   Constitutional: Negative.    HENT: Negative.     Eyes: Negative.    Respiratory: Negative.     Cardiovascular: Negative.    Gastrointestinal: Negative.    Endocrine: Negative.    Genitourinary: Negative.    Musculoskeletal:  Positive for myalgias.   Skin: Negative.    Allergic/Immunologic: Negative.    Neurological: Negative.    Hematological: Negative.    Psychiatric/Behavioral: Negative.       OBJECTIVE:  KPS: Karnofsky Score: 90 - Able to carry on normal activity; minor signs or symptoms of disease    VS:  There were no vitals taken for this visit.  BSA: There is no height or weight on file to calculate BSA.    Physical Exam  Vitals reviewed.   Constitutional:       Appearance: Normal appearance. He is normal weight.   HENT:      Head: Normocephalic.      Nose: Nose normal.      Mouth/Throat:      Mouth: Mucous membranes are moist.      Pharynx: Oropharynx is clear.   Eyes:      Extraocular Movements: Extraocular movements intact.      Pupils: Pupils are equal, round, and reactive to light.   Cardiovascular:      Rate and Rhythm: Normal rate and regular rhythm.      Pulses: Normal pulses.      Heart sounds: Normal heart sounds.   Pulmonary:      Effort: Pulmonary effort is normal.      Breath sounds: Normal breath sounds.   Abdominal:      General: Abdomen is flat. Bowel sounds are normal.      Palpations: Abdomen is soft.   Musculoskeletal:         General: Normal range of motion.      Cervical back: Normal range of motion and neck supple.   Skin:     General: Skin is warm and dry.   Neurological:      General: No focal deficit present.      Mental Status: He is alert and oriented to person, place, and time. Mental status is at baseline.    Psychiatric:         Mood and Affect: Mood normal.         Behavior: Behavior normal.         Thought Content: Thought content normal.         Judgment: Judgment normal.     Laboratory:  The pertinent laboratory results were reviewed and discussed with the patient.    Lab Results   Component Value Date    WBC 4.4 05/06/2025    HCT 42.6 05/06/2025    HGB 14.2 05/06/2025     05/06/2025    K 5.0 05/06/2025    CALCIUM 9.6 05/06/2025     05/06/2025    ALT 22 05/06/2025    AST 14 05/06/2025    BUN 24 (H) 05/06/2025    CREATININE 0.93 05/06/2025    KAPPA 19.27 (H) 05/06/2025    LAMBDA 1.07 05/06/2025    KAPLS 18.01 (H) 05/06/2025    SPEP Aberrant band detected. See immunofixation. 11/14/2024    IEPIN  11/14/2024 11/14/24  Known monoclonal IgA kappa in the beta region at 0.3 g/dL.     Unchanged from the previous analysis on 11/8/24.          05/06/2025    IGM 60 05/06/2025     (H) 05/06/2025      Note: for a comprehensive list of the patient's lab results, access the Results Review activity.    ASSESSMENT and PLAN:    MGUS:  - Former Michelle Mcpherson, CNP patient  - 0.3g/dL IgA Kappa M protein  - Initially Kappa FLC 11.13mg/dL, FLC ratio 8.50  - IgA 455  - 24 hour UPEP showed 43.1% or 45.7mg/24 hours Kappa FLC   - Negative osseous survey  - Increase in FLC ratio over past couple years (currently 15.99 in 11/2023)  - 24 hour urine showed 120mg/24 hour Kappa FLC, 58.8% of total urine protein  - No overt SLiM CRAB criteria  - BMBx performed today 4/29/24, showed 6-8% plasma cells, consistent with MGUS  - L hip pain since BMBx, ordered L hip x-ray to rule out acute cause, may be musculoskeletal vs. Neuropathic, negative xray, did PT and followed with ortho, has 95% resolved  - Will repeat labs today, plan to follow up in 6m if no acute changes    Neuroendocrine Tumor of Pancreas?:   -MRI Abdomen (5/23/25):  IMPRESSION:  Solitary pancreatic neoplasm measures about 4.5 cm along the long  axis of the  gland, 3-4 cm in each of the other two directions. It is  probably partially necrotic but I would characterize it as a solid  mass, not a cystic mass. It does not obstruct or arise from the main  duct  Outside CT (report, but images not available for review at this time)  reported an associated calcification  Today's exam confirms robust enhancement, for which I favor  hyperenhancement (typically associated with pancreatic neuroendocrine  neoplasms), not the hypoenhancement associated with pancreatic ductal  adenocarcinomas. It displaces the main duct around it. Margins appear  well-maintained, without invasion of (or origin of) the main  pancreatic duct or the adjacent splenic vein which this mass has a  wide footprint on. It is still technically indeterminate; however, I  favor a nonfunctioning neuroendocrine neoplasm. Nevertheless, I  recommend biochemical correlation for a potential pancreatic  neuroendocrine tumor  - Has follow up with general surgeon on 6/30  - Ordered neuroendocrine labs     Elevated PSA:   - MRI prostate (4/8/25):  IMPRESSION:  1.  Ill-defined T2 hypointense lesion in the left mid posterior  peripheral zone consistent with a PI-RADS 3 lesion.  2. Patchy T2 hypointensities in the rest of the peripheral zone  consistent with changes of prostatitis (PI-RADS 2).  3. Hypertrophy of the transition zone consistent with benign  prostatic hyperplasia.    RTC:  6 months virtual w/ labs prior     Mj Calloway, TALON-CNP

## 2025-06-16 ENCOUNTER — OFFICE VISIT (OUTPATIENT)
Dept: HEMATOLOGY/ONCOLOGY | Facility: CLINIC | Age: 70
End: 2025-06-16
Payer: MEDICARE

## 2025-06-16 VITALS
DIASTOLIC BLOOD PRESSURE: 79 MMHG | BODY MASS INDEX: 31.23 KG/M2 | OXYGEN SATURATION: 94 % | RESPIRATION RATE: 16 BRPM | HEART RATE: 71 BPM | TEMPERATURE: 97.2 F | WEIGHT: 198.97 LBS | SYSTOLIC BLOOD PRESSURE: 122 MMHG

## 2025-06-16 DIAGNOSIS — C7A.8 NEUROENDOCRINE CARCINOMA OF PANCREAS: ICD-10-CM

## 2025-06-16 DIAGNOSIS — D47.2 MGUS (MONOCLONAL GAMMOPATHY OF UNKNOWN SIGNIFICANCE): Primary | ICD-10-CM

## 2025-06-16 DIAGNOSIS — M48.061 SPINAL STENOSIS OF LUMBAR REGION WITHOUT NEUROGENIC CLAUDICATION: ICD-10-CM

## 2025-06-16 PROCEDURE — 1159F MED LIST DOCD IN RCRD: CPT

## 2025-06-16 PROCEDURE — 99215 OFFICE O/P EST HI 40 MIN: CPT

## 2025-06-16 PROCEDURE — G2211 COMPLEX E/M VISIT ADD ON: HCPCS

## 2025-06-16 PROCEDURE — 1126F AMNT PAIN NOTED NONE PRSNT: CPT

## 2025-06-16 ASSESSMENT — PAIN SCALES - GENERAL: PAINLEVEL_OUTOF10: 0-NO PAIN

## 2025-06-17 ENCOUNTER — LAB (OUTPATIENT)
Dept: LAB | Facility: CLINIC | Age: 70
End: 2025-06-17
Payer: MEDICARE

## 2025-06-17 DIAGNOSIS — D47.2 MGUS (MONOCLONAL GAMMOPATHY OF UNKNOWN SIGNIFICANCE): ICD-10-CM

## 2025-06-17 DIAGNOSIS — C7A.8 NEUROENDOCRINE CARCINOMA OF PANCREAS: ICD-10-CM

## 2025-06-17 LAB
ALBUMIN SERPL BCP-MCNC: 4.1 G/DL (ref 3.4–5)
ALP SERPL-CCNC: 52 U/L (ref 33–136)
ALT SERPL W P-5'-P-CCNC: 21 U/L (ref 10–52)
ANION GAP SERPL CALC-SCNC: 15 MMOL/L (ref 10–20)
AST SERPL W P-5'-P-CCNC: 16 U/L (ref 9–39)
BASOPHILS # BLD AUTO: 0.03 X10*3/UL (ref 0–0.1)
BASOPHILS NFR BLD AUTO: 0.8 %
BILIRUB SERPL-MCNC: 0.6 MG/DL (ref 0–1.2)
BUN SERPL-MCNC: 18 MG/DL (ref 6–23)
CALCIUM SERPL-MCNC: 9.7 MG/DL (ref 8.6–10.6)
CHLORIDE SERPL-SCNC: 104 MMOL/L (ref 98–107)
CO2 SERPL-SCNC: 25 MMOL/L (ref 21–32)
CREAT SERPL-MCNC: 0.96 MG/DL (ref 0.5–1.3)
EGFRCR SERPLBLD CKD-EPI 2021: 85 ML/MIN/1.73M*2
EOSINOPHIL # BLD AUTO: 0.28 X10*3/UL (ref 0–0.4)
EOSINOPHIL NFR BLD AUTO: 7.1 %
ERYTHROCYTE [DISTWIDTH] IN BLOOD BY AUTOMATED COUNT: 14.2 % (ref 11.5–14.5)
GLUCOSE SERPL-MCNC: 97 MG/DL (ref 74–99)
HCT VFR BLD AUTO: 39.4 % (ref 41–52)
HGB BLD-MCNC: 13.3 G/DL (ref 13.5–17.5)
IGA SERPL-MCNC: 460 MG/DL (ref 70–400)
IGG SERPL-MCNC: 750 MG/DL (ref 700–1600)
IGM SERPL-MCNC: 50 MG/DL (ref 40–230)
IMM GRANULOCYTES # BLD AUTO: 0.01 X10*3/UL (ref 0–0.5)
IMM GRANULOCYTES NFR BLD AUTO: 0.3 % (ref 0–0.9)
LYMPHOCYTES # BLD AUTO: 0.93 X10*3/UL (ref 0.8–3)
LYMPHOCYTES NFR BLD AUTO: 23.5 %
MCH RBC QN AUTO: 31.5 PG (ref 26–34)
MCHC RBC AUTO-ENTMCNC: 33.8 G/DL (ref 32–36)
MCV RBC AUTO: 93 FL (ref 80–100)
MONOCYTES # BLD AUTO: 0.31 X10*3/UL (ref 0.05–0.8)
MONOCYTES NFR BLD AUTO: 7.8 %
NEUTROPHILS # BLD AUTO: 2.39 X10*3/UL (ref 1.6–5.5)
NEUTROPHILS NFR BLD AUTO: 60.5 %
NRBC BLD-RTO: ABNORMAL /100{WBCS}
PLATELET # BLD AUTO: 178 X10*3/UL (ref 150–450)
POTASSIUM SERPL-SCNC: 5.2 MMOL/L (ref 3.5–5.3)
PROT SERPL-MCNC: 6.5 G/DL (ref 6.4–8.2)
PROT SERPL-MCNC: 6.5 G/DL (ref 6.4–8.2)
RBC # BLD AUTO: 4.22 X10*6/UL (ref 4.5–5.9)
SODIUM SERPL-SCNC: 139 MMOL/L (ref 136–145)
WBC # BLD AUTO: 4 X10*3/UL (ref 4.4–11.3)

## 2025-06-17 PROCEDURE — 84586 ASSAY OF VIP: CPT

## 2025-06-17 PROCEDURE — 83527 ASSAY OF INSULIN: CPT

## 2025-06-17 PROCEDURE — 84260 ASSAY OF SEROTONIN: CPT

## 2025-06-17 PROCEDURE — 86320 SERUM IMMUNOELECTROPHORESIS: CPT | Performed by: PATHOLOGY

## 2025-06-17 PROCEDURE — 82784 ASSAY IGA/IGD/IGG/IGM EACH: CPT

## 2025-06-17 PROCEDURE — 83521 IG LIGHT CHAINS FREE EACH: CPT

## 2025-06-17 PROCEDURE — 82941 ASSAY OF GASTRIN: CPT

## 2025-06-17 PROCEDURE — 82943 ASSAY OF GLUCAGON: CPT

## 2025-06-17 PROCEDURE — 86316 IMMUNOASSAY TUMOR OTHER: CPT

## 2025-06-17 PROCEDURE — 83497 ASSAY OF 5-HIAA: CPT

## 2025-06-17 PROCEDURE — 36415 COLL VENOUS BLD VENIPUNCTURE: CPT

## 2025-06-17 PROCEDURE — 84155 ASSAY OF PROTEIN SERUM: CPT

## 2025-06-17 PROCEDURE — 84165 PROTEIN E-PHORESIS SERUM: CPT | Performed by: PATHOLOGY

## 2025-06-17 PROCEDURE — 86334 IMMUNOFIX E-PHORESIS SERUM: CPT

## 2025-06-17 PROCEDURE — 85025 COMPLETE CBC W/AUTO DIFF WBC: CPT

## 2025-06-17 PROCEDURE — 80053 COMPREHEN METABOLIC PANEL: CPT

## 2025-06-18 LAB
KAPPA LC SERPL-MCNC: 21.73 MG/DL (ref 0.33–1.94)
KAPPA LC/LAMBDA SER: 18.11 {RATIO} (ref 0.26–1.65)
LAMBDA LC SERPL-MCNC: 1.2 MG/DL (ref 0.57–2.63)

## 2025-06-19 LAB
INSULIN FREE SERPL-ACNC: 8 UIU/ML (ref 3–25)
INSULIN SERPL-ACNC: 10 UIU/ML (ref 3–25)
VIP SERPL-MCNC: 35.8 PG/ML (ref 0–89.1)

## 2025-06-20 LAB
ALBUMIN: 4.1 G/DL (ref 3.4–5)
ALPHA 1 GLOBULIN: 0.2 G/DL (ref 0.2–0.6)
ALPHA 2 GLOBULIN: 0.6 G/DL (ref 0.4–1.1)
BETA GLOBULIN: 1 G/DL (ref 0.5–1.2)
GAMMA GLOBULIN: 0.7 G/DL (ref 0.5–1.4)
GASTRIN SERPL-MCNC: 75 PG/ML (ref 0–100)
IMMUNOFIXATION COMMENT: ABNORMAL
M-PROTEIN 1: 0.3 G/DL (ref ?–0)
PATH REVIEW - SERUM IMMUNOFIXATION: ABNORMAL
PATH REVIEW-SERUM PROTEIN ELECTROPHORESIS: ABNORMAL
PROTEIN ELECTROPHORESIS COMMENT: ABNORMAL

## 2025-06-22 LAB
5OH-INDOLEACETATE SERPL-MCNC: 7.6 NG/ML
CHROMOGRANIN A, LC/MS/MS: 165 NG/ML

## 2025-06-26 LAB — SEROTONIN SER-MCNC: 54 NG/ML (ref 23–230)

## 2025-06-26 NOTE — PROGRESS NOTES
A virtual visit (audio and visual connection) between the patient (at the originating site) and the provider (at the distant site) was utilized to provide this telehealth service.    Verbal consent was requested and obtained from the patient immediately prior to the telehealth visit.     Subjective   Mr. Dai is a 70-year-old male who is referred by Wendy OLIVIER for a pancreatic mass.     He presented to the [facility] ED in May with abdominal pain. He was found to have a kidney stone. As part of that work-up, he had a CT A/P with contrast that showed an incidental finding of a 1.6 cm x 1.9 cm x 1.9 cm hypodense mass in the pancreatic tail with a coarse calcification along the superior margin of this mass without MPD dilatation.     He subsequently had a MRI Abdomen on 5/23/35 that described a solitary, enhancing and partially necrotic mass measuring up to 4.5 cm, displacing the MPD around it. He was referred here to discuss further work-up.     He denies a personal history of acute pancreatitis.     He denies abdominal pain, early satiety, poor appetite, jaundice or unintentional weight loss.     Medical and surgical history: BPH, nephrolithiasis, MGUS, laceration/tendon repair (left hand).   Family history: No family history of pancreatitis, pancreatic cancer.  Social history: Former smoker. Occasional alcohol use. No illicits.  (wife is Serene). Lives in [facility]. He is a .     Objective     There were no vitals taken for this visit.     Physical Exam  A physical exam was not conducted as this was a phone or virtual visit. The patient is in no acute distress.     Assessment/Plan   Mr. Dai is a 70-year-old male who is referred by Wendy OLIVIER for a pancreatic mass.     PLAN: He has a large hyperenhancing mass in the tail of the pancreas with a large central calcification. The main pancreatic duct appears to be displaced by the mass but is not dilated. This has the  radiographic appearance of pancreatic neuroendocrine tumor.     I reviewed his recent blood work ordered by his Heme/Onc provider, all normal. I discussed that most pancreatic neuroendocrine tumors are nonfunctional, meaning they do not overproduce hormones and are therefore asymptomatic.     I recommend an EUS-guided biopsy for tissue diagnosis. We discussed the procedure, preparation and risks in detail and he agrees to proceed. Will plan to have this scheduled urgently at [facility]. After the biopsy, I will arrange a visit with Dr. Moreira to discuss resection.       Stella Argueta PA-C

## 2025-06-27 ENCOUNTER — TELEMEDICINE (OUTPATIENT)
Dept: SURGICAL ONCOLOGY | Facility: CLINIC | Age: 70
End: 2025-06-27
Payer: MEDICARE

## 2025-06-27 DIAGNOSIS — K86.89 PANCREATIC MASS (HHS-HCC): ICD-10-CM

## 2025-06-27 PROCEDURE — 99204 OFFICE O/P NEW MOD 45 MIN: CPT | Performed by: PHYSICIAN ASSISTANT

## 2025-06-30 ENCOUNTER — APPOINTMENT (OUTPATIENT)
Dept: SURGICAL ONCOLOGY | Facility: CLINIC | Age: 70
End: 2025-06-30
Payer: MEDICARE

## 2025-07-01 ENCOUNTER — ANESTHESIA EVENT (OUTPATIENT)
Dept: GASTROENTEROLOGY | Facility: HOSPITAL | Age: 70
End: 2025-07-01
Payer: MEDICARE

## 2025-07-01 NOTE — ANESTHESIA PREPROCEDURE EVALUATION
Patient: Bib Dai    Procedure Information       Date/Time: 07/02/25 0900    Scheduled providers: Radhika Slade MD    Procedure: ENDOSCOPIC ULTRASOUND (UPPER)    Location: [facility]            Relevant Problems   Anesthesia (within normal limits)  No family hx /mh      Cardiac   (+) Hypercholesterolemia   (+) Moderate aortic regurgitation      Pulmonary (within normal limits)      Neuro (within normal limits)      GI (within normal limits)      /Renal   (+) Benign prostatic hyperplasia with nocturia   (+) Renal calculi      Liver (within normal limits)      Endocrine (within normal limits)      Hematology   (+) Anemia      Musculoskeletal   (+) Chronic right-sided low back pain with right-sided sciatica   (+) Degeneration of lumbar intervertebral disc   (+) Lumbar spinal stenosis   (+) Spinal stenosis      HEENT (within normal limits)      ID (within normal limits)      Skin (within normal limits)      GYN (within normal limits)       Clinical information reviewed:                   NPO Detail:  No data recorded     Physical Exam    Airway  Mallampati: II  TM distance: >3 FB  Neck ROM: full  Mouth opening: 3 or more finger widths     Cardiovascular - normal exam   Dental   Comments: intact     Pulmonary - normal exam   Abdominal          Anesthesia Plan    History of general anesthesia?: yes  History of complications of general anesthesia?: no    ASA 3     MAC     intravenous induction   Anesthetic plan and risks discussed with patient.  Use of blood products discussed with patient who consented to blood products.    Plan discussed with CAA and CRNA.

## 2025-07-02 ENCOUNTER — ANESTHESIA (OUTPATIENT)
Dept: GASTROENTEROLOGY | Facility: HOSPITAL | Age: 70
End: 2025-07-02
Payer: MEDICARE

## 2025-07-02 ENCOUNTER — HOSPITAL ENCOUNTER (OUTPATIENT)
Dept: GASTROENTEROLOGY | Facility: HOSPITAL | Age: 70
Discharge: HOME | End: 2025-07-02
Payer: MEDICARE

## 2025-07-02 VITALS
OXYGEN SATURATION: 98 % | WEIGHT: 190 LBS | BODY MASS INDEX: 28.14 KG/M2 | RESPIRATION RATE: 16 BRPM | TEMPERATURE: 97.9 F | HEIGHT: 69 IN | SYSTOLIC BLOOD PRESSURE: 115 MMHG | DIASTOLIC BLOOD PRESSURE: 88 MMHG | HEART RATE: 54 BPM

## 2025-07-02 DIAGNOSIS — K86.89 PANCREATIC MASS (HHS-HCC): ICD-10-CM

## 2025-07-02 PROBLEM — N20.0 RENAL CALCULI: Status: ACTIVE | Noted: 2025-07-02

## 2025-07-02 PROCEDURE — 7100000009 HC PHASE TWO TIME - INITIAL BASE CHARGE

## 2025-07-02 PROCEDURE — A43238 PR EDG INTRMURAL US NEEDLE ASPIRATE/BIOPSY ESOPHAGS: Performed by: NURSE ANESTHETIST, CERTIFIED REGISTERED

## 2025-07-02 PROCEDURE — 2720000007 HC OR 272 NO HCPCS

## 2025-07-02 PROCEDURE — 43238 EGD US FINE NEEDLE BX/ASPIR: CPT | Performed by: INTERNAL MEDICINE

## 2025-07-02 PROCEDURE — 3700000001 HC GENERAL ANESTHESIA TIME - INITIAL BASE CHARGE

## 2025-07-02 PROCEDURE — 7100000010 HC PHASE TWO TIME - EACH INCREMENTAL 1 MINUTE

## 2025-07-02 PROCEDURE — A43238 PR EDG INTRMURAL US NEEDLE ASPIRATE/BIOPSY ESOPHAGS: Performed by: ANESTHESIOLOGY

## 2025-07-02 PROCEDURE — 3700000002 HC GENERAL ANESTHESIA TIME - EACH INCREMENTAL 1 MINUTE

## 2025-07-02 PROCEDURE — 2500000004 HC RX 250 GENERAL PHARMACY W/ HCPCS (ALT 636 FOR OP/ED): Performed by: NURSE ANESTHETIST, CERTIFIED REGISTERED

## 2025-07-02 PROCEDURE — 99100 ANES PT EXTEME AGE<1 YR&>70: CPT | Performed by: ANESTHESIOLOGY

## 2025-07-02 RX ORDER — SODIUM CHLORIDE, SODIUM LACTATE, POTASSIUM CHLORIDE, CALCIUM CHLORIDE 600; 310; 30; 20 MG/100ML; MG/100ML; MG/100ML; MG/100ML
100 INJECTION, SOLUTION INTRAVENOUS CONTINUOUS
Status: DISCONTINUED | OUTPATIENT
Start: 2025-07-02 | End: 2025-07-03 | Stop reason: HOSPADM

## 2025-07-02 RX ORDER — ACETAMINOPHEN 325 MG/1
650 TABLET ORAL EVERY 4 HOURS PRN
Status: DISCONTINUED | OUTPATIENT
Start: 2025-07-02 | End: 2025-07-03 | Stop reason: HOSPADM

## 2025-07-02 RX ORDER — FENTANYL CITRATE 50 UG/ML
INJECTION, SOLUTION INTRAMUSCULAR; INTRAVENOUS AS NEEDED
Status: DISCONTINUED | OUTPATIENT
Start: 2025-07-02 | End: 2025-07-02

## 2025-07-02 RX ORDER — ONDANSETRON HYDROCHLORIDE 2 MG/ML
4 INJECTION, SOLUTION INTRAVENOUS ONCE AS NEEDED
Status: DISCONTINUED | OUTPATIENT
Start: 2025-07-02 | End: 2025-07-03 | Stop reason: HOSPADM

## 2025-07-02 RX ORDER — PROPOFOL 10 MG/ML
INJECTION, EMULSION INTRAVENOUS AS NEEDED
Status: DISCONTINUED | OUTPATIENT
Start: 2025-07-02 | End: 2025-07-02

## 2025-07-02 RX ORDER — PHENYLEPHRINE HYDROCHLORIDE 10 MG/ML
INJECTION INTRAVENOUS AS NEEDED
Status: DISCONTINUED | OUTPATIENT
Start: 2025-07-02 | End: 2025-07-02

## 2025-07-02 RX ORDER — LIDOCAINE HCL/PF 100 MG/5ML
SYRINGE (ML) INTRAVENOUS AS NEEDED
Status: DISCONTINUED | OUTPATIENT
Start: 2025-07-02 | End: 2025-07-02

## 2025-07-02 RX ORDER — LIDOCAINE IN NACL,ISO-OSMOT/PF 30 MG/3 ML
0.1 SYRINGE (ML) INJECTION ONCE
Status: DISCONTINUED | OUTPATIENT
Start: 2025-07-02 | End: 2025-07-03 | Stop reason: HOSPADM

## 2025-07-02 RX ADMIN — PROPOFOL 200 MCG/KG/MIN: 10 INJECTION, EMULSION INTRAVENOUS at 09:10

## 2025-07-02 RX ADMIN — SODIUM CHLORIDE, SODIUM LACTATE, POTASSIUM CHLORIDE, AND CALCIUM CHLORIDE: 600; 310; 30; 20 INJECTION, SOLUTION INTRAVENOUS at 09:09

## 2025-07-02 RX ADMIN — PROPOFOL 40 MG: 10 INJECTION, EMULSION INTRAVENOUS at 09:09

## 2025-07-02 RX ADMIN — PHENYLEPHRINE HYDROCHLORIDE 120 MCG: 10 INJECTION INTRAVENOUS at 09:14

## 2025-07-02 RX ADMIN — FENTANYL CITRATE 100 MCG: 50 INJECTION, SOLUTION INTRAMUSCULAR; INTRAVENOUS at 09:09

## 2025-07-02 RX ADMIN — PHENYLEPHRINE HYDROCHLORIDE 120 MCG: 10 INJECTION INTRAVENOUS at 09:17

## 2025-07-02 RX ADMIN — LIDOCAINE HYDROCHLORIDE 60 MG: 20 INJECTION INTRAVENOUS at 09:09

## 2025-07-02 ASSESSMENT — PAIN - FUNCTIONAL ASSESSMENT
PAIN_FUNCTIONAL_ASSESSMENT: 0-10

## 2025-07-02 ASSESSMENT — PAIN SCALES - GENERAL
PAINLEVEL_OUTOF10: 0 - NO PAIN

## 2025-07-02 ASSESSMENT — COLUMBIA-SUICIDE SEVERITY RATING SCALE - C-SSRS
6. HAVE YOU EVER DONE ANYTHING, STARTED TO DO ANYTHING, OR PREPARED TO DO ANYTHING TO END YOUR LIFE?: NO
1. IN THE PAST MONTH, HAVE YOU WISHED YOU WERE DEAD OR WISHED YOU COULD GO TO SLEEP AND NOT WAKE UP?: NO
2. HAVE YOU ACTUALLY HAD ANY THOUGHTS OF KILLING YOURSELF?: NO

## 2025-07-02 NOTE — H&P
"History Of Present Illness  Bib Dai is a 70 y.o. male presenting with panc tail lesion.     Past Medical History  Medical History[1]  Surgical History  Surgical History[2]  Social History  He reports that he has never smoked. He has never used smokeless tobacco. He reports current alcohol use of about 1.0 standard drink of alcohol per week. He reports that he does not use drugs.    Family History  Family History[3]     Allergies  Allergies[4]  Review of Systems   All other systems reviewed and are negative.       Physical Exam  Pulmonary:      Effort: Pulmonary effort is normal.   Abdominal:      General: Abdomen is flat.      Tenderness: There is no abdominal tenderness.          Last Recorded Vitals  Blood pressure (!) 137/92, pulse 70, temperature 36.4 °C (97.5 °F), temperature source Temporal, resp. rate 19, height 1.753 m (5' 9\"), weight 86.2 kg (190 lb), SpO2 96%.    Assessment/Plan   EUS FNB     Radhika Slade MD       [1]   Past Medical History:  Diagnosis Date    Bitten or stung by nonvenomous insect and other nonvenomous arthropods, initial encounter 11/30/2017    Tick bite    Impaired fasting glucose 07/20/2017    Elevated fasting glucose    Other intervertebral disc displacement, lumbar region     Lumbar disc herniation    Solitary pulmonary nodule     Pulmonary nodule   [2]   Past Surgical History:  Procedure Laterality Date    COLONOSCOPY  12/19/2021    Complete Colonoscopy   [3]   Family History  Problem Relation Name Age of Onset    Hypertension Mother  50 - 59   [4]   Allergies  Allergen Reactions    Bee Pollen Unknown     "

## 2025-07-03 ENCOUNTER — APPOINTMENT (OUTPATIENT)
Dept: SURGICAL ONCOLOGY | Facility: CLINIC | Age: 70
End: 2025-07-03
Payer: MEDICARE

## 2025-07-10 LAB
LAB AP ASR DISCLAIMER: NORMAL
LAB AP ASR DISCLAIMER: NORMAL
LABORATORY COMMENT REPORT: NORMAL
PATH REPORT.COMMENTS IMP SPEC: NORMAL
PATH REPORT.FINAL DX SPEC: NORMAL
PATH REPORT.FINAL DX SPEC: NORMAL
PATH REPORT.GROSS SPEC: NORMAL
PATH REPORT.GROSS SPEC: NORMAL
PATH REPORT.INTRAOP OBS SPEC DOC: NORMAL
PATH REPORT.RELEVANT HX SPEC: NORMAL
PATH REPORT.TOTAL CANCER: NORMAL
PATH REPORT.TOTAL CANCER: NORMAL
RESIDENT REVIEW: NORMAL

## 2025-07-14 ENCOUNTER — APPOINTMENT (OUTPATIENT)
Dept: SURGICAL ONCOLOGY | Facility: CLINIC | Age: 70
End: 2025-07-14
Payer: MEDICARE

## 2025-07-14 VITALS
HEART RATE: 88 BPM | BODY MASS INDEX: 28.35 KG/M2 | DIASTOLIC BLOOD PRESSURE: 85 MMHG | SYSTOLIC BLOOD PRESSURE: 129 MMHG | WEIGHT: 198 LBS | HEIGHT: 70 IN

## 2025-07-14 DIAGNOSIS — K86.89 PANCREATIC MASS (HHS-HCC): Primary | ICD-10-CM

## 2025-07-14 DIAGNOSIS — D30.8: ICD-10-CM

## 2025-07-14 DIAGNOSIS — D3A.8 PRIMARY PANCREATIC NEUROENDOCRINE TUMOR: ICD-10-CM

## 2025-07-14 DIAGNOSIS — C7A.8 NEUROENDOCRINE CARCINOMA OF PANCREAS: ICD-10-CM

## 2025-07-14 DIAGNOSIS — D49.9 NEOPLASM OF UNSPECIFIED BEHAVIOR OF UNSPECIFIED SITE: ICD-10-CM

## 2025-07-14 DIAGNOSIS — D13.7 BENIGN NEOPLASM OF ENDOCRINE PANCREAS: ICD-10-CM

## 2025-07-14 PROCEDURE — 1159F MED LIST DOCD IN RCRD: CPT | Performed by: SURGERY

## 2025-07-14 PROCEDURE — 1126F AMNT PAIN NOTED NONE PRSNT: CPT | Performed by: SURGERY

## 2025-07-14 PROCEDURE — 3008F BODY MASS INDEX DOCD: CPT | Performed by: SURGERY

## 2025-07-14 PROCEDURE — 99213 OFFICE O/P EST LOW 20 MIN: CPT | Performed by: SURGERY

## 2025-07-14 RX ORDER — HEPARIN SODIUM 5000 [USP'U]/ML
5000 INJECTION, SOLUTION INTRAVENOUS; SUBCUTANEOUS ONCE
OUTPATIENT
Start: 2025-07-14 | End: 2025-07-14

## 2025-07-14 RX ORDER — ACETAMINOPHEN 325 MG/1
650 TABLET ORAL EVERY 6 HOURS PRN
COMMUNITY

## 2025-07-14 RX ORDER — CEFAZOLIN SODIUM 2 G/100ML
2 INJECTION, SOLUTION INTRAVENOUS ONCE
OUTPATIENT
Start: 2025-07-14 | End: 2025-07-14

## 2025-07-14 ASSESSMENT — PAIN SCALES - GENERAL: PAINLEVEL_OUTOF10: 0-NO PAIN

## 2025-07-14 NOTE — PROGRESS NOTES
"Subjective     Bib Dai is a 70 y.o. male who is referred by Dr. Slade and Stella Argueta. 35 minute visit.     HPI    Patient presented with kidney stones.  Incidental finding noted on imaging was a mass in the tail the pancreas.  The patient has subsequently underwent more imaging and it reveals a 4 cm mass in the body tail of the pancreas consistent with a neuroendocrine tumor.  He is here for surgical management.  He is largely asymptomatic.    Review of Systems   All other systems reviewed and are negative.           Social History     Socioeconomic History    Marital status:      Spouse name: Not on file    Number of children: Not on file    Years of education: Not on file    Highest education level: Not on file   Occupational History    Not on file   Tobacco Use    Smoking status: Never    Smokeless tobacco: Never   Vaping Use    Vaping status: Never Used   Substance and Sexual Activity    Alcohol use: Yes     Alcohol/week: 1.0 standard drink of alcohol     Types: 1 Cans of beer per week    Drug use: Never    Sexual activity: Yes     Partners: Choose not to disclose     Birth control/protection: None   Other Topics Concern    Not on file   Social History Narrative    Not on file     Social Drivers of Health     Financial Resource Strain: Not on file   Food Insecurity: Not on file   Transportation Needs: Not on file   Physical Activity: Not on file   Stress: Not on file   Social Connections: Not on file   Intimate Partner Violence: Not on file   Housing Stability: Not on file      Medications Ordered Prior to Encounter[1]   Family History[2]   Medical History[3]   Surgical History[4]     Objective     /85   Pulse 88   Ht 1.765 m (5' 9.5\")   Wt 89.8 kg (198 lb)   BMI 28.82 kg/m²      Physical Exam  General: in no acute distress, comfortable  Eyes: no pallor or scleral icterus  Ears, nose, throat: no oropharyngeal edema  Cardiovascular: normal rate, regular rhythm  Respiratory: no wheezing. " Even and unlabored.  Gastrointestinal: abdomen soft, non-tender, no masses  Musculoskeletal: normal gate, no deformities  Integumentary: no concerning lesions, no jaundice  Neurologic: no gross deficits  Psychiatric: cognition intact, mood appropriate    RESULTS  Imaging reveals a hypervascular mass with a central calcification in the body of the pancreas.  No other organs are involved with the tumor.  This can be removed with distal pancreatectomy.  No spread of disease.    Assessment/Plan   I showed the patient the relevant findings on imaging.  I discussed the treatment approach.  We discussed the need for surgery and the risk of spread if we did not pursue resectional therapy.  This can be removed with a distal pancreatectomy and splenectomy.  Will likely remove the gallbladder at the time of surgery.  We explained the reason because of the risk of gallstones with octreotide which she very well may require in the future.  Additionally, we will get a net spot imaging study as part of her staging.  Will do this surgery with an open approach.  We discussed the expected hospital course as well as the risks of the operation.    The risks of the operation include heart attack, stroke, death, infection, bleeding, injury to adjacent structures, and many others.     He signed consent today.    This note was created by dictation. Please excuse the typos.        Fritz Moreira MD          [1]   Current Outpatient Medications on File Prior to Visit   Medication Sig Dispense Refill    acetaminophen (Tylenol) 325 mg tablet Take 2 tablets (650 mg) by mouth every 6 hours if needed.      EPINEPHrine 0.3 mg/0.3 mL injection syringe 0.3 mL (0.3 mg). As Directed      ibuprofen 200 mg tablet Take 1-2 tablets (200-400 mg) by mouth every 2 hours if needed.       No current facility-administered medications on file prior to visit.   [2]   Family History  Problem Relation Name Age of Onset    Hypertension Mother  50 - 59   [3]   Past  Medical History:  Diagnosis Date    Bitten or stung by nonvenomous insect and other nonvenomous arthropods, initial encounter 11/30/2017    Tick bite    Impaired fasting glucose 07/20/2017    Elevated fasting glucose    Other intervertebral disc displacement, lumbar region     Lumbar disc herniation    Solitary pulmonary nodule     Pulmonary nodule   [4]   Past Surgical History:  Procedure Laterality Date    COLONOSCOPY  12/19/2021    Complete Colonoscopy

## 2025-07-23 ENCOUNTER — HOSPITAL ENCOUNTER (OUTPATIENT)
Dept: RADIOLOGY | Facility: HOSPITAL | Age: 70
Discharge: HOME | End: 2025-07-23
Payer: MEDICARE

## 2025-07-23 ENCOUNTER — APPOINTMENT (OUTPATIENT)
Dept: PREADMISSION TESTING | Facility: HOSPITAL | Age: 70
End: 2025-07-23
Payer: MEDICARE

## 2025-07-23 DIAGNOSIS — C7A.8 NEUROENDOCRINE CARCINOMA OF PANCREAS: ICD-10-CM

## 2025-07-23 DIAGNOSIS — K86.89 PANCREATIC MASS (HHS-HCC): ICD-10-CM

## 2025-07-23 PROCEDURE — 78815 PET IMAGE W/CT SKULL-THIGH: CPT | Mod: PI

## 2025-07-23 PROCEDURE — 3430000001 HC RX 343 DIAGNOSTIC RADIOPHARMACEUTICALS: Mod: JZ,TB | Performed by: SURGERY

## 2025-07-23 PROCEDURE — A9587 GALLIUM GA-68: HCPCS | Mod: JZ,TB | Performed by: SURGERY

## 2025-07-23 PROCEDURE — 78815 PET IMAGE W/CT SKULL-THIGH: CPT | Mod: PET TUMOR INIT TX STRAT | Performed by: STUDENT IN AN ORGANIZED HEALTH CARE EDUCATION/TRAINING PROGRAM

## 2025-07-23 RX ADMIN — 68GA-DOTATATE 5.9 MILLICURIE: KIT INTRAVENOUS at 11:04

## 2025-07-29 ENCOUNTER — APPOINTMENT (OUTPATIENT)
Dept: RADIOLOGY | Facility: CLINIC | Age: 70
End: 2025-07-29
Payer: MEDICARE

## 2025-08-08 ENCOUNTER — APPOINTMENT (OUTPATIENT)
Dept: PREADMISSION TESTING | Facility: HOSPITAL | Age: 70
End: 2025-08-08
Payer: MEDICARE

## 2025-08-14 ASSESSMENT — DERMATOLOGY QUALITY OF LIFE (QOL) ASSESSMENT
RATE HOW EMOTIONALLY BOTHERED YOU ARE BY YOUR SKIN PROBLEM (FOR EXAMPLE, WORRY, EMBARRASSMENT, FRUSTRATION): 0 - NEVER BOTHERED
RATE HOW BOTHERED YOU ARE BY SYMPTOMS OF YOUR SKIN PROBLEM (EG, ITCHING, STINGING BURNING, HURTING OR SKIN IRRITATION): 0 - NEVER BOTHERED
RATE HOW BOTHERED YOU ARE BY EFFECTS OF YOUR SKIN PROBLEMS ON YOUR ACTIVITIES (EG, GOING OUT, ACCOMPLISHING WHAT YOU WANT, WORK ACTIVITIES OR YOUR RELATIONSHIPS WITH OTHERS): 0 - NEVER BOTHERED
DATE THE QUALITY-OF-LIFE ASSESSMENT WAS COMPLETED: 67431
WHAT SINGLE SKIN CONDITION LISTED BELOW IS THE PATIENT ANSWERING THE QUALITY-OF-LIFE ASSESSMENT QUESTIONS ABOUT: NONE OF THE ABOVE
WHAT SINGLE SKIN CONDITION LISTED BELOW IS THE PATIENT ANSWERING THE QUALITY-OF-LIFE ASSESSMENT QUESTIONS ABOUT: NONE OF THE ABOVE
RATE HOW EMOTIONALLY BOTHERED YOU ARE BY YOUR SKIN PROBLEM (FOR EXAMPLE, WORRY, EMBARRASSMENT, FRUSTRATION): 0 - NEVER BOTHERED
RATE HOW BOTHERED YOU ARE BY EFFECTS OF YOUR SKIN PROBLEMS ON YOUR ACTIVITIES (EG, GOING OUT, ACCOMPLISHING WHAT YOU WANT, WORK ACTIVITIES OR YOUR RELATIONSHIPS WITH OTHERS): 0 - NEVER BOTHERED
RATE HOW BOTHERED YOU ARE BY SYMPTOMS OF YOUR SKIN PROBLEM (EG, ITCHING, STINGING BURNING, HURTING OR SKIN IRRITATION): 0 - NEVER BOTHERED

## 2025-08-15 ENCOUNTER — PRE-ADMISSION TESTING (OUTPATIENT)
Dept: PREADMISSION TESTING | Facility: HOSPITAL | Age: 70
End: 2025-08-15
Payer: MEDICARE

## 2025-08-15 ENCOUNTER — HOSPITAL ENCOUNTER (OUTPATIENT)
Dept: CARDIOLOGY | Facility: HOSPITAL | Age: 70
Discharge: HOME | End: 2025-08-15
Payer: MEDICARE

## 2025-08-15 VITALS
OXYGEN SATURATION: 96 % | TEMPERATURE: 97.5 F | HEIGHT: 70 IN | WEIGHT: 195.7 LBS | DIASTOLIC BLOOD PRESSURE: 76 MMHG | BODY MASS INDEX: 28.02 KG/M2 | HEART RATE: 64 BPM | SYSTOLIC BLOOD PRESSURE: 114 MMHG

## 2025-08-15 DIAGNOSIS — K86.89 PANCREATIC MASS (HHS-HCC): ICD-10-CM

## 2025-08-15 DIAGNOSIS — D13.7 BENIGN NEOPLASM OF ENDOCRINE PANCREAS: ICD-10-CM

## 2025-08-15 DIAGNOSIS — I77.89 ASCENDING AORTA ENLARGEMENT: ICD-10-CM

## 2025-08-15 DIAGNOSIS — I35.1 AORTIC VALVE INSUFFICIENCY, ETIOLOGY OF CARDIAC VALVE DISEASE UNSPECIFIED: ICD-10-CM

## 2025-08-15 DIAGNOSIS — Z01.818 PREOPERATIVE TESTING: ICD-10-CM

## 2025-08-15 DIAGNOSIS — D3A.8 PRIMARY PANCREATIC NEUROENDOCRINE TUMOR: Primary | ICD-10-CM

## 2025-08-15 DIAGNOSIS — D30.8: ICD-10-CM

## 2025-08-15 DIAGNOSIS — D49.9 NEOPLASM OF UNSPECIFIED BEHAVIOR OF UNSPECIFIED SITE: ICD-10-CM

## 2025-08-15 LAB
ABO GROUP (TYPE) IN BLOOD: NORMAL
ALBUMIN SERPL BCP-MCNC: 4.4 G/DL (ref 3.4–5)
ALP SERPL-CCNC: 47 U/L (ref 33–136)
ALT SERPL W P-5'-P-CCNC: 17 U/L (ref 10–52)
ANION GAP SERPL CALC-SCNC: 13 MMOL/L (ref 10–20)
ANTIBODY SCREEN: NORMAL
APTT PPP: 30 SECONDS (ref 26–36)
AST SERPL W P-5'-P-CCNC: 16 U/L (ref 9–39)
BILIRUB SERPL-MCNC: 0.7 MG/DL (ref 0–1.2)
BUN SERPL-MCNC: 17 MG/DL (ref 6–23)
CALCIUM SERPL-MCNC: 9.3 MG/DL (ref 8.6–10.6)
CHLORIDE SERPL-SCNC: 102 MMOL/L (ref 98–107)
CO2 SERPL-SCNC: 29 MMOL/L (ref 21–32)
CREAT SERPL-MCNC: 0.86 MG/DL (ref 0.5–1.3)
EGFRCR SERPLBLD CKD-EPI 2021: >90 ML/MIN/1.73M*2
ERYTHROCYTE [DISTWIDTH] IN BLOOD BY AUTOMATED COUNT: 14 % (ref 11.5–14.5)
EST. AVERAGE GLUCOSE BLD GHB EST-MCNC: 111 MG/DL
GLUCOSE SERPL-MCNC: 81 MG/DL (ref 74–99)
HBA1C MFR BLD: 5.5 % (ref ?–5.7)
HCT VFR BLD AUTO: 42 % (ref 41–52)
HGB BLD-MCNC: 13.7 G/DL (ref 13.5–17.5)
INR PPP: 1 (ref 0.9–1.1)
MCH RBC QN AUTO: 31.3 PG (ref 26–34)
MCHC RBC AUTO-ENTMCNC: 32.6 G/DL (ref 32–36)
MCV RBC AUTO: 96 FL (ref 80–100)
NRBC BLD-RTO: 0 /100 WBCS (ref 0–0)
PLATELET # BLD AUTO: 185 X10*3/UL (ref 150–450)
POTASSIUM SERPL-SCNC: 5.1 MMOL/L (ref 3.5–5.3)
PROT SERPL-MCNC: 6.9 G/DL (ref 6.4–8.2)
PROTHROMBIN TIME: 11 SECONDS (ref 9.8–12.4)
RBC # BLD AUTO: 4.38 X10*6/UL (ref 4.5–5.9)
RH FACTOR (ANTIGEN D): NORMAL
SODIUM SERPL-SCNC: 139 MMOL/L (ref 136–145)
WBC # BLD AUTO: 4.7 X10*3/UL (ref 4.4–11.3)

## 2025-08-15 PROCEDURE — 36415 COLL VENOUS BLD VENIPUNCTURE: CPT

## 2025-08-15 PROCEDURE — 93005 ELECTROCARDIOGRAM TRACING: CPT

## 2025-08-15 PROCEDURE — 85027 COMPLETE CBC AUTOMATED: CPT

## 2025-08-15 PROCEDURE — 83036 HEMOGLOBIN GLYCOSYLATED A1C: CPT

## 2025-08-15 PROCEDURE — 85730 THROMBOPLASTIN TIME PARTIAL: CPT

## 2025-08-15 PROCEDURE — 87081 CULTURE SCREEN ONLY: CPT

## 2025-08-15 PROCEDURE — 93010 ELECTROCARDIOGRAM REPORT: CPT | Performed by: INTERNAL MEDICINE

## 2025-08-15 PROCEDURE — 99205 OFFICE O/P NEW HI 60 MIN: CPT

## 2025-08-15 PROCEDURE — 86900 BLOOD TYPING SEROLOGIC ABO: CPT

## 2025-08-15 PROCEDURE — 80053 COMPREHEN METABOLIC PANEL: CPT

## 2025-08-15 RX ORDER — CHLORHEXIDINE GLUCONATE 40 MG/ML
SOLUTION TOPICAL
Qty: 473 ML | Refills: 0 | Status: SHIPPED | OUTPATIENT
Start: 2025-08-15

## 2025-08-15 RX ORDER — CHLORHEXIDINE GLUCONATE ORAL RINSE 1.2 MG/ML
SOLUTION DENTAL
Qty: 120 ML | Refills: 0 | Status: SHIPPED | OUTPATIENT
Start: 2025-08-15

## 2025-08-15 ASSESSMENT — ENCOUNTER SYMPTOMS
EYES NEGATIVE: 1
CONSTITUTIONAL NEGATIVE: 1
ENDOCRINE NEGATIVE: 1
RESPIRATORY NEGATIVE: 1
GASTROINTESTINAL NEGATIVE: 1
MUSCULOSKELETAL NEGATIVE: 1
NECK NEGATIVE: 1
NEUROLOGICAL NEGATIVE: 1
CARDIOVASCULAR NEGATIVE: 1

## 2025-08-15 ASSESSMENT — DUKE ACTIVITY SCORE INDEX (DASI)
CAN YOU HAVE SEXUAL RELATIONS: YES
CAN YOU DO MODERATE WORK AROUND THE HOUSE LIKE VACUUMING, SWEEPING FLOORS OR CARRYING GROCERIES: YES
CAN YOU CLIMB A FLIGHT OF STAIRS OR WALK UP A HILL: YES
CAN YOU DO YARD WORK LIKE RAKING LEAVES, WEEDING OR PUSHING A MOWER: YES
DASI METS SCORE: 9
CAN YOU RUN A SHORT DISTANCE: YES
CAN YOU PARTICIPATE IN STRENOUS SPORTS LIKE SWIMMING, SINGLES TENNIS, FOOTBALL, BASKETBALL, OR SKIING: NO
CAN YOU WALK INDOORS, SUCH AS AROUND YOUR HOUSE: YES
CAN YOU TAKE CARE OF YOURSELF (EAT, DRESS, BATHE, OR USE TOILET): YES
TOTAL_SCORE: 50.7
CAN YOU DO LIGHT WORK AROUND THE HOUSE LIKE DUSTING OR WASHING DISHES: YES
CAN YOU WALK A BLOCK OR TWO ON LEVEL GROUND: YES
CAN YOU DO HEAVY WORK AROUND THE HOUSE LIKE SCRUBBING FLOORS OR LIFTING AND MOVING HEAVY FURNITURE: YES
CAN YOU PARTICIPATE IN MODERATE RECREATIONAL ACTIVITIES LIKE GOLF, BOWLING, DANCING, DOUBLES TENNIS OR THROWING A BASEBALL OR FOOTBALL: YES

## 2025-08-15 ASSESSMENT — LIFESTYLE VARIABLES: SMOKING_STATUS: NONSMOKER

## 2025-08-16 LAB — STAPHYLOCOCCUS SPEC CULT: ABNORMAL

## 2025-08-19 LAB
ATRIAL RATE: 60 BPM
P AXIS: 11 DEGREES
P OFFSET: 167 MS
P ONSET: 122 MS
PR INTERVAL: 184 MS
Q ONSET: 214 MS
QRS COUNT: 10 BEATS
QRS DURATION: 94 MS
QT INTERVAL: 446 MS
QTC CALCULATION(BAZETT): 446 MS
QTC FREDERICIA: 446 MS
R AXIS: -48 DEGREES
T AXIS: -23 DEGREES
T OFFSET: 437 MS
VENTRICULAR RATE: 60 BPM

## 2025-08-19 PROCEDURE — 93005 ELECTROCARDIOGRAM TRACING: CPT

## 2025-08-21 ENCOUNTER — APPOINTMENT (OUTPATIENT)
Dept: DERMATOLOGY | Facility: CLINIC | Age: 70
End: 2025-08-21
Payer: MEDICARE

## 2025-08-21 DIAGNOSIS — L82.1 SEBORRHEIC KERATOSIS: ICD-10-CM

## 2025-08-21 DIAGNOSIS — Z12.83 SCREENING EXAM FOR SKIN CANCER: Primary | ICD-10-CM

## 2025-08-21 DIAGNOSIS — L81.4 LENTIGO: ICD-10-CM

## 2025-08-21 DIAGNOSIS — D18.01 HEMANGIOMA OF SKIN: ICD-10-CM

## 2025-08-21 PROCEDURE — 1036F TOBACCO NON-USER: CPT | Performed by: NURSE PRACTITIONER

## 2025-08-21 PROCEDURE — 1160F RVW MEDS BY RX/DR IN RCRD: CPT | Performed by: NURSE PRACTITIONER

## 2025-08-21 PROCEDURE — 99203 OFFICE O/P NEW LOW 30 MIN: CPT | Performed by: NURSE PRACTITIONER

## 2025-08-21 PROCEDURE — 1159F MED LIST DOCD IN RCRD: CPT | Performed by: NURSE PRACTITIONER

## 2025-08-25 ENCOUNTER — HOSPITAL ENCOUNTER (OUTPATIENT)
Dept: CARDIOLOGY | Facility: CLINIC | Age: 70
Discharge: HOME | End: 2025-08-25
Payer: MEDICARE

## 2025-08-25 DIAGNOSIS — I77.89 ASCENDING AORTA ENLARGEMENT: ICD-10-CM

## 2025-08-25 DIAGNOSIS — I35.1 AORTIC VALVE INSUFFICIENCY, ETIOLOGY OF CARDIAC VALVE DISEASE UNSPECIFIED: ICD-10-CM

## 2025-08-25 DIAGNOSIS — I77.810 THORACIC AORTIC ECTASIA: ICD-10-CM

## 2025-08-25 PROCEDURE — 93306 TTE W/DOPPLER COMPLETE: CPT | Performed by: INTERNAL MEDICINE

## 2025-08-25 PROCEDURE — 93306 TTE W/DOPPLER COMPLETE: CPT

## 2025-08-26 LAB
AORTIC VALVE MEAN GRADIENT: 6 MMHG
AORTIC VALVE PEAK VELOCITY: 1.67 M/S
AV PEAK GRADIENT: 11 MMHG
AVA (PEAK VEL): 2.16 CM2
AVA (VTI): 2.08 CM2
EJECTION FRACTION APICAL 4 CHAMBER: 54
EJECTION FRACTION: 63 %
LEFT ATRIUM VOLUME AREA LENGTH INDEX BSA: 30.1 ML/M2
LEFT VENTRICLE INTERNAL DIMENSION DIASTOLE: 5.05 CM (ref 3.5–6)
LEFT VENTRICULAR OUTFLOW TRACT DIAMETER: 2 CM
MITRAL VALVE E/A RATIO: 0.92
RIGHT VENTRICLE FREE WALL PEAK S': 10.6 CM/S
TRICUSPID ANNULAR PLANE SYSTOLIC EXCURSION: 2.4 CM

## 2025-08-28 ENCOUNTER — ANESTHESIA EVENT (OUTPATIENT)
Dept: OPERATING ROOM | Facility: HOSPITAL | Age: 70
End: 2025-08-28
Payer: MEDICARE

## 2025-08-29 ENCOUNTER — ANESTHESIA (OUTPATIENT)
Dept: OPERATING ROOM | Facility: HOSPITAL | Age: 70
End: 2025-08-29
Payer: MEDICARE

## 2025-08-29 ENCOUNTER — HOSPITAL ENCOUNTER (INPATIENT)
Facility: HOSPITAL | Age: 70
End: 2025-08-29
Attending: SURGERY | Admitting: SURGERY
Payer: MEDICARE

## 2025-08-29 DIAGNOSIS — K86.89 PANCREATIC MASS (HHS-HCC): ICD-10-CM

## 2025-08-29 DIAGNOSIS — D3A.8 PRIMARY PANCREATIC NEUROENDOCRINE TUMOR: Primary | ICD-10-CM

## 2025-08-29 LAB
ABO GROUP (TYPE) IN BLOOD: NORMAL
ANION GAP SERPL CALC-SCNC: 15 MMOL/L (ref 10–20)
APTT PPP: 19 SECONDS (ref 26–36)
BUN SERPL-MCNC: 13 MG/DL (ref 6–23)
CALCIUM SERPL-MCNC: 8.7 MG/DL (ref 8.6–10.6)
CHLORIDE SERPL-SCNC: 104 MMOL/L (ref 98–107)
CO2 SERPL-SCNC: 26 MMOL/L (ref 21–32)
CREAT SERPL-MCNC: 0.95 MG/DL (ref 0.5–1.3)
EGFRCR SERPLBLD CKD-EPI 2021: 86 ML/MIN/1.73M*2
ERYTHROCYTE [DISTWIDTH] IN BLOOD BY AUTOMATED COUNT: 13.9 % (ref 11.5–14.5)
GLUCOSE BLD MANUAL STRIP-MCNC: 185 MG/DL (ref 74–99)
GLUCOSE BLD MANUAL STRIP-MCNC: 226 MG/DL (ref 74–99)
GLUCOSE BLD MANUAL STRIP-MCNC: 89 MG/DL (ref 74–99)
GLUCOSE SERPL-MCNC: 162 MG/DL (ref 74–99)
HCT VFR BLD AUTO: 39.7 % (ref 41–52)
HGB BLD-MCNC: 12.8 G/DL (ref 13.5–17.5)
INR PPP: 1 (ref 0.9–1.1)
MAGNESIUM SERPL-MCNC: 2.05 MG/DL (ref 1.6–2.4)
MCH RBC QN AUTO: 31.1 PG (ref 26–34)
MCHC RBC AUTO-ENTMCNC: 32.2 G/DL (ref 32–36)
MCV RBC AUTO: 97 FL (ref 80–100)
NRBC BLD-RTO: 0 /100 WBCS (ref 0–0)
PHOSPHATE SERPL-MCNC: 3.9 MG/DL (ref 2.5–4.9)
PLATELET # BLD AUTO: 168 X10*3/UL (ref 150–450)
POTASSIUM SERPL-SCNC: 5.1 MMOL/L (ref 3.5–5.3)
PROTHROMBIN TIME: 11.4 SECONDS (ref 9.8–12.4)
RBC # BLD AUTO: 4.11 X10*6/UL (ref 4.5–5.9)
RH FACTOR (ANTIGEN D): NORMAL
SODIUM SERPL-SCNC: 140 MMOL/L (ref 136–145)
WBC # BLD AUTO: 8.8 X10*3/UL (ref 4.4–11.3)

## 2025-08-29 PROCEDURE — 3600000004 HC OR TIME - INITIAL BASE CHARGE - PROCEDURE LEVEL FOUR: Performed by: SURGERY

## 2025-08-29 PROCEDURE — 7100000001 HC RECOVERY ROOM TIME - INITIAL BASE CHARGE: Performed by: SURGERY

## 2025-08-29 PROCEDURE — 1170000001 HC PRIVATE ONCOLOGY ROOM DAILY

## 2025-08-29 PROCEDURE — 2500000004 HC RX 250 GENERAL PHARMACY W/ HCPCS (ALT 636 FOR OP/ED): Performed by: NURSE PRACTITIONER

## 2025-08-29 PROCEDURE — 82947 ASSAY GLUCOSE BLOOD QUANT: CPT

## 2025-08-29 PROCEDURE — 36415 COLL VENOUS BLD VENIPUNCTURE: CPT

## 2025-08-29 PROCEDURE — 2500000004 HC RX 250 GENERAL PHARMACY W/ HCPCS (ALT 636 FOR OP/ED)

## 2025-08-29 PROCEDURE — 2500000001 HC RX 250 WO HCPCS SELF ADMINISTERED DRUGS (ALT 637 FOR MEDICARE OP)

## 2025-08-29 PROCEDURE — 2500000004 HC RX 250 GENERAL PHARMACY W/ HCPCS (ALT 636 FOR OP/ED): Performed by: STUDENT IN AN ORGANIZED HEALTH CARE EDUCATION/TRAINING PROGRAM

## 2025-08-29 PROCEDURE — 2500000005 HC RX 250 GENERAL PHARMACY W/O HCPCS

## 2025-08-29 PROCEDURE — 85610 PROTHROMBIN TIME: CPT

## 2025-08-29 PROCEDURE — 84100 ASSAY OF PHOSPHORUS: CPT

## 2025-08-29 PROCEDURE — 3700000001 HC GENERAL ANESTHESIA TIME - INITIAL BASE CHARGE: Performed by: SURGERY

## 2025-08-29 PROCEDURE — 3600000009 HC OR TIME - EACH INCREMENTAL 1 MINUTE - PROCEDURE LEVEL FOUR: Performed by: SURGERY

## 2025-08-29 PROCEDURE — 3700000002 HC GENERAL ANESTHESIA TIME - EACH INCREMENTAL 1 MINUTE: Performed by: SURGERY

## 2025-08-29 PROCEDURE — 2500000005 HC RX 250 GENERAL PHARMACY W/O HCPCS: Performed by: SURGERY

## 2025-08-29 PROCEDURE — 2500000004 HC RX 250 GENERAL PHARMACY W/ HCPCS (ALT 636 FOR OP/ED): Mod: JZ,TB

## 2025-08-29 PROCEDURE — 99223 1ST HOSP IP/OBS HIGH 75: CPT

## 2025-08-29 PROCEDURE — 48140 PARTIAL REMOVAL OF PANCREAS: CPT | Performed by: SURGERY

## 2025-08-29 PROCEDURE — 2500000002 HC RX 250 W HCPCS SELF ADMINISTERED DRUGS (ALT 637 FOR MEDICARE OP, ALT 636 FOR OP/ED): Performed by: NURSE PRACTITIONER

## 2025-08-29 PROCEDURE — 2720000007 HC OR 272 NO HCPCS: Performed by: SURGERY

## 2025-08-29 PROCEDURE — 7100000002 HC RECOVERY ROOM TIME - EACH INCREMENTAL 1 MINUTE: Performed by: SURGERY

## 2025-08-29 PROCEDURE — 96372 THER/PROPH/DIAG INJ SC/IM: CPT | Performed by: SURGERY

## 2025-08-29 PROCEDURE — 83735 ASSAY OF MAGNESIUM: CPT

## 2025-08-29 PROCEDURE — 2500000004 HC RX 250 GENERAL PHARMACY W/ HCPCS (ALT 636 FOR OP/ED): Performed by: SURGERY

## 2025-08-29 PROCEDURE — 80048 BASIC METABOLIC PNL TOTAL CA: CPT

## 2025-08-29 PROCEDURE — 85027 COMPLETE CBC AUTOMATED: CPT

## 2025-08-29 RX ORDER — SODIUM CHLORIDE, SODIUM LACTATE, POTASSIUM CHLORIDE, CALCIUM CHLORIDE 600; 310; 30; 20 MG/100ML; MG/100ML; MG/100ML; MG/100ML
INJECTION, SOLUTION INTRAVENOUS CONTINUOUS PRN
Status: DISCONTINUED | OUTPATIENT
Start: 2025-08-29 | End: 2025-08-29

## 2025-08-29 RX ORDER — PROPOFOL 10 MG/ML
INJECTION, EMULSION INTRAVENOUS AS NEEDED
Status: DISCONTINUED | OUTPATIENT
Start: 2025-08-29 | End: 2025-08-29

## 2025-08-29 RX ORDER — CEFOXITIN 1 G/1
INJECTION, POWDER, FOR SOLUTION INTRAVENOUS AS NEEDED
Status: DISCONTINUED | OUTPATIENT
Start: 2025-08-29 | End: 2025-08-29

## 2025-08-29 RX ORDER — SODIUM CHLORIDE 0.9 G/100ML
INJECTION, SOLUTION IRRIGATION AS NEEDED
Status: DISCONTINUED | OUTPATIENT
Start: 2025-08-29 | End: 2025-08-29 | Stop reason: HOSPADM

## 2025-08-29 RX ORDER — LIDOCAINE HYDROCHLORIDE 20 MG/ML
INJECTION, SOLUTION INFILTRATION; PERINEURAL AS NEEDED
Status: DISCONTINUED | OUTPATIENT
Start: 2025-08-29 | End: 2025-08-29

## 2025-08-29 RX ORDER — SODIUM CHLORIDE, SODIUM LACTATE, POTASSIUM CHLORIDE, CALCIUM CHLORIDE 600; 310; 30; 20 MG/100ML; MG/100ML; MG/100ML; MG/100ML
125 INJECTION, SOLUTION INTRAVENOUS CONTINUOUS
Status: DISCONTINUED | OUTPATIENT
Start: 2025-08-29 | End: 2025-08-30

## 2025-08-29 RX ORDER — OXYCODONE HYDROCHLORIDE 5 MG/1
5 TABLET ORAL EVERY 6 HOURS PRN
Status: ACTIVE | OUTPATIENT
Start: 2025-08-29

## 2025-08-29 RX ORDER — PHENYLEPHRINE HCL IN 0.9% NACL 0.4MG/10ML
SYRINGE (ML) INTRAVENOUS AS NEEDED
Status: DISCONTINUED | OUTPATIENT
Start: 2025-08-29 | End: 2025-08-29

## 2025-08-29 RX ORDER — ACETAMINOPHEN 325 MG/1
650 TABLET ORAL EVERY 6 HOURS
Status: DISPENSED | OUTPATIENT
Start: 2025-08-29

## 2025-08-29 RX ORDER — SODIUM CHLORIDE, SODIUM LACTATE, POTASSIUM CHLORIDE, CALCIUM CHLORIDE 600; 310; 30; 20 MG/100ML; MG/100ML; MG/100ML; MG/100ML
100 INJECTION, SOLUTION INTRAVENOUS CONTINUOUS
Status: DISCONTINUED | OUTPATIENT
Start: 2025-08-29 | End: 2025-08-29 | Stop reason: HOSPADM

## 2025-08-29 RX ORDER — ONDANSETRON HYDROCHLORIDE 2 MG/ML
4 INJECTION, SOLUTION INTRAVENOUS ONCE AS NEEDED
Status: DISCONTINUED | OUTPATIENT
Start: 2025-08-29 | End: 2025-08-29 | Stop reason: HOSPADM

## 2025-08-29 RX ORDER — FENTANYL CITRATE 50 UG/ML
INJECTION, SOLUTION INTRAMUSCULAR; INTRAVENOUS AS NEEDED
Status: DISCONTINUED | OUTPATIENT
Start: 2025-08-29 | End: 2025-08-29

## 2025-08-29 RX ORDER — PROCHLORPERAZINE EDISYLATE 5 MG/ML
5 INJECTION INTRAMUSCULAR; INTRAVENOUS ONCE AS NEEDED
Status: DISCONTINUED | OUTPATIENT
Start: 2025-08-29 | End: 2025-08-29 | Stop reason: HOSPADM

## 2025-08-29 RX ORDER — DEXTROSE 50 % IN WATER (D50W) INTRAVENOUS SYRINGE
12.5
Status: ACTIVE | OUTPATIENT
Start: 2025-08-29

## 2025-08-29 RX ORDER — ROCURONIUM BROMIDE 10 MG/ML
INJECTION, SOLUTION INTRAVENOUS AS NEEDED
Status: DISCONTINUED | OUTPATIENT
Start: 2025-08-29 | End: 2025-08-29

## 2025-08-29 RX ORDER — ROPIVACAINE IN 0.9% SOD CHL/PF 0.2 %
14 PLASTIC BAG, INJECTION (ML) EPIDURAL CONTINUOUS
Status: ACTIVE | OUTPATIENT
Start: 2025-08-29

## 2025-08-29 RX ORDER — HEPARIN SODIUM 5000 [USP'U]/ML
5000 INJECTION, SOLUTION INTRAVENOUS; SUBCUTANEOUS EVERY 8 HOURS
Status: DISPENSED | OUTPATIENT
Start: 2025-08-30

## 2025-08-29 RX ORDER — ONDANSETRON HYDROCHLORIDE 2 MG/ML
INJECTION, SOLUTION INTRAVENOUS AS NEEDED
Status: DISCONTINUED | OUTPATIENT
Start: 2025-08-29 | End: 2025-08-29

## 2025-08-29 RX ORDER — NALOXONE HYDROCHLORIDE 0.4 MG/ML
0.2 INJECTION, SOLUTION INTRAMUSCULAR; INTRAVENOUS; SUBCUTANEOUS EVERY 5 MIN PRN
Status: ACTIVE | OUTPATIENT
Start: 2025-08-29

## 2025-08-29 RX ORDER — CEFAZOLIN SODIUM 2 G/100ML
2 INJECTION, SOLUTION INTRAVENOUS ONCE
Status: DISCONTINUED | OUTPATIENT
Start: 2025-08-29 | End: 2025-08-29 | Stop reason: HOSPADM

## 2025-08-29 RX ORDER — PANTOPRAZOLE SODIUM 40 MG/10ML
40 INJECTION, POWDER, LYOPHILIZED, FOR SOLUTION INTRAVENOUS DAILY
Status: DISCONTINUED | OUTPATIENT
Start: 2025-08-29 | End: 2025-08-29 | Stop reason: SDUPTHER

## 2025-08-29 RX ORDER — HEPARIN SODIUM 5000 [USP'U]/ML
5000 INJECTION, SOLUTION INTRAVENOUS; SUBCUTANEOUS ONCE
Status: COMPLETED | OUTPATIENT
Start: 2025-08-29 | End: 2025-08-29

## 2025-08-29 RX ORDER — HYDROMORPHONE HCL/0.9% NACL/PF 15 MG/30ML
PATIENT CONTROLLED ANALGESIA SYRINGE INTRAVENOUS CONTINUOUS
Refills: 0 | Status: DISPENSED | OUTPATIENT
Start: 2025-08-29

## 2025-08-29 RX ORDER — GLYCOPYRROLATE 0.2 MG/ML
INJECTION INTRAMUSCULAR; INTRAVENOUS AS NEEDED
Status: DISCONTINUED | OUTPATIENT
Start: 2025-08-29 | End: 2025-08-29

## 2025-08-29 RX ORDER — ACETAMINOPHEN 10 MG/ML
INJECTION, SOLUTION INTRAVENOUS AS NEEDED
Status: DISCONTINUED | OUTPATIENT
Start: 2025-08-29 | End: 2025-08-29

## 2025-08-29 RX ORDER — MAGNESIUM SULFATE HEPTAHYDRATE 500 MG/ML
INJECTION, SOLUTION INTRAMUSCULAR; INTRAVENOUS AS NEEDED
Status: DISCONTINUED | OUTPATIENT
Start: 2025-08-29 | End: 2025-08-29

## 2025-08-29 RX ORDER — DEXTROSE 50 % IN WATER (D50W) INTRAVENOUS SYRINGE
25
Status: ACTIVE | OUTPATIENT
Start: 2025-08-29

## 2025-08-29 RX ORDER — LIDOCAINE HYDROCHLORIDE 10 MG/ML
0.1 INJECTION, SOLUTION INFILTRATION; PERINEURAL ONCE
Status: DISCONTINUED | OUTPATIENT
Start: 2025-08-29 | End: 2025-08-29 | Stop reason: HOSPADM

## 2025-08-29 RX ORDER — NALOXONE HYDROCHLORIDE 0.4 MG/ML
0.2 INJECTION, SOLUTION INTRAMUSCULAR; INTRAVENOUS; SUBCUTANEOUS AS NEEDED
Status: ACTIVE | OUTPATIENT
Start: 2025-08-29

## 2025-08-29 RX ORDER — HYDROMORPHONE HYDROCHLORIDE 1 MG/ML
1 INJECTION, SOLUTION INTRAMUSCULAR; INTRAVENOUS; SUBCUTANEOUS EVERY 4 HOURS PRN
Status: ACTIVE | OUTPATIENT
Start: 2025-08-29

## 2025-08-29 RX ORDER — POLYETHYLENE GLYCOL 3350 17 G/17G
17 POWDER, FOR SOLUTION ORAL DAILY
Status: DISPENSED | OUTPATIENT
Start: 2025-08-30

## 2025-08-29 RX ORDER — PANTOPRAZOLE SODIUM 40 MG/10ML
40 INJECTION, POWDER, LYOPHILIZED, FOR SOLUTION INTRAVENOUS DAILY
Status: DISPENSED | OUTPATIENT
Start: 2025-08-29

## 2025-08-29 RX ORDER — ROPIVACAINE HCL/PF 100MG/20ML
SYRINGE (ML) INJECTION AS NEEDED
Status: DISCONTINUED | OUTPATIENT
Start: 2025-08-29 | End: 2025-08-29

## 2025-08-29 RX ORDER — INSULIN LISPRO 100 [IU]/ML
0-10 INJECTION, SOLUTION INTRAVENOUS; SUBCUTANEOUS EVERY 6 HOURS
Status: DISPENSED | OUTPATIENT
Start: 2025-08-29

## 2025-08-29 RX ORDER — HYDROMORPHONE HYDROCHLORIDE 0.2 MG/ML
0.2 INJECTION INTRAMUSCULAR; INTRAVENOUS; SUBCUTANEOUS EVERY 5 MIN PRN
Status: DISCONTINUED | OUTPATIENT
Start: 2025-08-29 | End: 2025-08-29 | Stop reason: HOSPADM

## 2025-08-29 RX ORDER — OXYCODONE HYDROCHLORIDE 5 MG/1
5 TABLET ORAL EVERY 4 HOURS PRN
Status: DISCONTINUED | OUTPATIENT
Start: 2025-08-29 | End: 2025-08-29 | Stop reason: HOSPADM

## 2025-08-29 RX ORDER — HYDROMORPHONE HYDROCHLORIDE 1 MG/ML
INJECTION, SOLUTION INTRAMUSCULAR; INTRAVENOUS; SUBCUTANEOUS AS NEEDED
Status: DISCONTINUED | OUTPATIENT
Start: 2025-08-29 | End: 2025-08-29

## 2025-08-29 RX ADMIN — SODIUM CHLORIDE, POTASSIUM CHLORIDE, SODIUM LACTATE AND CALCIUM CHLORIDE: 600; 310; 30; 20 INJECTION, SOLUTION INTRAVENOUS at 08:10

## 2025-08-29 RX ADMIN — PROPOFOL 140 MG: 10 INJECTION, EMULSION INTRAVENOUS at 07:29

## 2025-08-29 RX ADMIN — SODIUM CHLORIDE, POTASSIUM CHLORIDE, SODIUM LACTATE AND CALCIUM CHLORIDE: 600; 310; 30; 20 INJECTION, SOLUTION INTRAVENOUS at 07:14

## 2025-08-29 RX ADMIN — DEXAMETHASONE SODIUM PHOSPHATE 4 MG: 4 INJECTION INTRA-ARTICULAR; INTRALESIONAL; INTRAMUSCULAR; INTRAVENOUS; SOFT TISSUE at 07:30

## 2025-08-29 RX ADMIN — Medication: at 13:22

## 2025-08-29 RX ADMIN — GLYCOPYRROLATE 0.6 MG: 0.2 SOLUTION INTRAMUSCULAR; INTRAVENOUS at 06:18

## 2025-08-29 RX ADMIN — SUGAMMADEX 400 MG: 100 INJECTION, SOLUTION INTRAVENOUS at 11:28

## 2025-08-29 RX ADMIN — ONDANSETRON 4 MG: 2 INJECTION INTRAMUSCULAR; INTRAVENOUS at 11:19

## 2025-08-29 RX ADMIN — CEFOXITIN 2 G: 1 INJECTION, POWDER, FOR SOLUTION INTRAVENOUS at 07:35

## 2025-08-29 RX ADMIN — ROCURONIUM BROMIDE 20 MG: 10 INJECTION INTRAVENOUS at 09:35

## 2025-08-29 RX ADMIN — HYDROMORPHONE HYDROCHLORIDE 0.5 MG: 1 INJECTION, SOLUTION INTRAMUSCULAR; INTRAVENOUS; SUBCUTANEOUS at 13:12

## 2025-08-29 RX ADMIN — MAGNESIUM SULFATE HEPTAHYDRATE 2 G: 500 INJECTION, SOLUTION INTRAMUSCULAR; INTRAVENOUS at 09:37

## 2025-08-29 RX ADMIN — HYDROMORPHONE HYDROCHLORIDE 0.5 MG: 1 INJECTION, SOLUTION INTRAMUSCULAR; INTRAVENOUS; SUBCUTANEOUS at 12:13

## 2025-08-29 RX ADMIN — HYDROMORPHONE HYDROCHLORIDE 0.5 MG: 1 INJECTION, SOLUTION INTRAMUSCULAR; INTRAVENOUS; SUBCUTANEOUS at 11:57

## 2025-08-29 RX ADMIN — ROCURONIUM BROMIDE 20 MG: 10 INJECTION INTRAVENOUS at 08:48

## 2025-08-29 RX ADMIN — HYDROMORPHONE HYDROCHLORIDE 0.3 MG: 1 INJECTION, SOLUTION INTRAMUSCULAR; INTRAVENOUS; SUBCUTANEOUS at 11:13

## 2025-08-29 RX ADMIN — HEPARIN SODIUM 5000 UNITS: 5000 INJECTION, SOLUTION INTRAVENOUS; SUBCUTANEOUS at 06:36

## 2025-08-29 RX ADMIN — SODIUM CHLORIDE, SODIUM LACTATE, POTASSIUM CHLORIDE, AND CALCIUM CHLORIDE 125 ML/HR: .6; .31; .03; .02 INJECTION, SOLUTION INTRAVENOUS at 17:08

## 2025-08-29 RX ADMIN — ROCURONIUM BROMIDE 20 MG: 10 INJECTION INTRAVENOUS at 10:47

## 2025-08-29 RX ADMIN — HYDROMORPHONE HYDROCHLORIDE 0.5 MG: 1 INJECTION, SOLUTION INTRAMUSCULAR; INTRAVENOUS; SUBCUTANEOUS at 12:03

## 2025-08-29 RX ADMIN — Medication 30 ML: at 06:18

## 2025-08-29 RX ADMIN — HYDROMORPHONE HYDROCHLORIDE 0.2 MG: 1 INJECTION, SOLUTION INTRAMUSCULAR; INTRAVENOUS; SUBCUTANEOUS at 10:08

## 2025-08-29 RX ADMIN — LIDOCAINE HYDROCHLORIDE 100 MG: 20 INJECTION, SOLUTION INFILTRATION; PERINEURAL at 07:29

## 2025-08-29 RX ADMIN — HYDROMORPHONE HYDROCHLORIDE 0.2 MG: 1 INJECTION, SOLUTION INTRAMUSCULAR; INTRAVENOUS; SUBCUTANEOUS at 11:23

## 2025-08-29 RX ADMIN — PANTOPRAZOLE SODIUM 40 MG: 40 INJECTION, POWDER, FOR SOLUTION INTRAVENOUS at 18:26

## 2025-08-29 RX ADMIN — SODIUM CHLORIDE, SODIUM LACTATE, POTASSIUM CHLORIDE, CALCIUM CHLORIDE: 600; 310; 30; 20 INJECTION, SOLUTION INTRAVENOUS at 07:38

## 2025-08-29 RX ADMIN — INSULIN LISPRO 2 UNITS: 100 INJECTION, SOLUTION INTRAVENOUS; SUBCUTANEOUS at 17:11

## 2025-08-29 RX ADMIN — Medication 14 ML/HR: at 09:29

## 2025-08-29 RX ADMIN — ROCURONIUM BROMIDE 100 MG: 10 INJECTION INTRAVENOUS at 07:31

## 2025-08-29 RX ADMIN — ACETAMINOPHEN 650 MG: 325 TABLET ORAL at 22:36

## 2025-08-29 RX ADMIN — Medication 80 MCG: at 11:08

## 2025-08-29 RX ADMIN — HYDROMORPHONE HYDROCHLORIDE 0.5 MG: 1 INJECTION, SOLUTION INTRAMUSCULAR; INTRAVENOUS; SUBCUTANEOUS at 12:42

## 2025-08-29 RX ADMIN — HYDROMORPHONE HYDROCHLORIDE 0.3 MG: 1 INJECTION, SOLUTION INTRAMUSCULAR; INTRAVENOUS; SUBCUTANEOUS at 11:01

## 2025-08-29 RX ADMIN — Medication 120 MCG: at 07:42

## 2025-08-29 RX ADMIN — FENTANYL CITRATE 100 MCG: 50 INJECTION, SOLUTION INTRAMUSCULAR; INTRAVENOUS at 07:29

## 2025-08-29 RX ADMIN — ACETAMINOPHEN 1000 MG: 10 INJECTION, SOLUTION INTRAVENOUS at 11:18

## 2025-08-29 RX ADMIN — CEFOXITIN 2 G: 1 INJECTION, POWDER, FOR SOLUTION INTRAVENOUS at 11:11

## 2025-08-29 RX ADMIN — PROPOFOL 30 MG: 10 INJECTION, EMULSION INTRAVENOUS at 07:34

## 2025-08-29 RX ADMIN — SODIUM CHLORIDE, SODIUM LACTATE, POTASSIUM CHLORIDE, AND CALCIUM CHLORIDE 500 ML: .6; .31; .03; .02 INJECTION, SOLUTION INTRAVENOUS at 14:24

## 2025-08-29 SDOH — SOCIAL STABILITY: SOCIAL INSECURITY: ABUSE: ADULT

## 2025-08-29 SDOH — SOCIAL STABILITY: SOCIAL INSECURITY: WITHIN THE LAST YEAR, HAVE YOU BEEN AFRAID OF YOUR PARTNER OR EX-PARTNER?: NO

## 2025-08-29 SDOH — SOCIAL STABILITY: SOCIAL INSECURITY: WITHIN THE LAST YEAR, HAVE YOU BEEN HUMILIATED OR EMOTIONALLY ABUSED IN OTHER WAYS BY YOUR PARTNER OR EX-PARTNER?: NO

## 2025-08-29 SDOH — HEALTH STABILITY: MENTAL HEALTH: HOW OFTEN DO YOU HAVE SIX OR MORE DRINKS ON ONE OCCASION?: NEVER

## 2025-08-29 SDOH — SOCIAL STABILITY: SOCIAL INSECURITY: DO YOU FEEL UNSAFE GOING BACK TO THE PLACE WHERE YOU ARE LIVING?: NO

## 2025-08-29 SDOH — ECONOMIC STABILITY: INCOME INSECURITY: IN THE PAST 12 MONTHS HAS THE ELECTRIC, GAS, OIL, OR WATER COMPANY THREATENED TO SHUT OFF SERVICES IN YOUR HOME?: NO

## 2025-08-29 SDOH — ECONOMIC STABILITY: FOOD INSECURITY: WITHIN THE PAST 12 MONTHS, YOU WORRIED THAT YOUR FOOD WOULD RUN OUT BEFORE YOU GOT THE MONEY TO BUY MORE.: NEVER TRUE

## 2025-08-29 SDOH — SOCIAL STABILITY: SOCIAL INSECURITY: ARE THERE ANY APPARENT SIGNS OF INJURIES/BEHAVIORS THAT COULD BE RELATED TO ABUSE/NEGLECT?: NO

## 2025-08-29 SDOH — HEALTH STABILITY: MENTAL HEALTH: HOW MANY DRINKS CONTAINING ALCOHOL DO YOU HAVE ON A TYPICAL DAY WHEN YOU ARE DRINKING?: PATIENT DOES NOT DRINK

## 2025-08-29 SDOH — HEALTH STABILITY: MENTAL HEALTH: HOW OFTEN DO YOU HAVE A DRINK CONTAINING ALCOHOL?: NEVER

## 2025-08-29 SDOH — SOCIAL STABILITY: SOCIAL INSECURITY
WITHIN THE LAST YEAR, HAVE YOU BEEN RAPED OR FORCED TO HAVE ANY KIND OF SEXUAL ACTIVITY BY YOUR PARTNER OR EX-PARTNER?: NO

## 2025-08-29 SDOH — SOCIAL STABILITY: SOCIAL INSECURITY: DO YOU FEEL ANYONE HAS EXPLOITED OR TAKEN ADVANTAGE OF YOU FINANCIALLY OR OF YOUR PERSONAL PROPERTY?: NO

## 2025-08-29 SDOH — SOCIAL STABILITY: SOCIAL INSECURITY: HAVE YOU HAD THOUGHTS OF HARMING ANYONE ELSE?: NO

## 2025-08-29 SDOH — SOCIAL STABILITY: SOCIAL INSECURITY
WITHIN THE LAST YEAR, HAVE YOU BEEN KICKED, HIT, SLAPPED, OR OTHERWISE PHYSICALLY HURT BY YOUR PARTNER OR EX-PARTNER?: NO

## 2025-08-29 SDOH — SOCIAL STABILITY: SOCIAL INSECURITY: ARE YOU OR HAVE YOU BEEN THREATENED OR ABUSED PHYSICALLY, EMOTIONALLY, OR SEXUALLY BY ANYONE?: NO

## 2025-08-29 SDOH — ECONOMIC STABILITY: FOOD INSECURITY: WITHIN THE PAST 12 MONTHS, THE FOOD YOU BOUGHT JUST DIDN'T LAST AND YOU DIDN'T HAVE MONEY TO GET MORE.: NEVER TRUE

## 2025-08-29 SDOH — SOCIAL STABILITY: SOCIAL INSECURITY: DOES ANYONE TRY TO KEEP YOU FROM HAVING/CONTACTING OTHER FRIENDS OR DOING THINGS OUTSIDE YOUR HOME?: NO

## 2025-08-29 SDOH — SOCIAL STABILITY: SOCIAL INSECURITY: WERE YOU ABLE TO COMPLETE ALL THE BEHAVIORAL HEALTH SCREENINGS?: YES

## 2025-08-29 SDOH — SOCIAL STABILITY: SOCIAL INSECURITY: HAS ANYONE EVER THREATENED TO HURT YOUR FAMILY OR YOUR PETS?: NO

## 2025-08-29 SDOH — HEALTH STABILITY: MENTAL HEALTH: CURRENT SMOKER: 0

## 2025-08-29 ASSESSMENT — ACTIVITIES OF DAILY LIVING (ADL)
WALKS IN HOME: INDEPENDENT
LACK_OF_TRANSPORTATION: NO
GROOMING: INDEPENDENT
JUDGMENT_ADEQUATE_SAFELY_COMPLETE_DAILY_ACTIVITIES: YES
FEEDING YOURSELF: INDEPENDENT
TOILETING: INDEPENDENT
HEARING - RIGHT EAR: FUNCTIONAL
HEARING - LEFT EAR: FUNCTIONAL
ADEQUATE_TO_COMPLETE_ADL: YES
DRESSING YOURSELF: INDEPENDENT
FEEDING YOURSELF: INDEPENDENT
LACK_OF_TRANSPORTATION: NO
BATHING: INDEPENDENT
GROOMING: INDEPENDENT
PATIENT'S MEMORY ADEQUATE TO SAFELY COMPLETE DAILY ACTIVITIES?: YES

## 2025-08-29 ASSESSMENT — COGNITIVE AND FUNCTIONAL STATUS - GENERAL
HELP NEEDED FOR BATHING: A LITTLE
MOBILITY SCORE: 24
WALKING IN HOSPITAL ROOM: A LITTLE
TURNING FROM BACK TO SIDE WHILE IN FLAT BAD: A LITTLE
STANDING UP FROM CHAIR USING ARMS: A LITTLE
MOVING TO AND FROM BED TO CHAIR: A LITTLE
CLIMB 3 TO 5 STEPS WITH RAILING: A LITTLE
DAILY ACTIVITIY SCORE: 20
TOILETING: A LITTLE
DRESSING REGULAR LOWER BODY CLOTHING: A LITTLE
MOVING FROM LYING ON BACK TO SITTING ON SIDE OF FLAT BED WITH BEDRAILS: A LITTLE
MOBILITY SCORE: 18
DRESSING REGULAR UPPER BODY CLOTHING: A LITTLE
PATIENT BASELINE BEDBOUND: NO
DAILY ACTIVITIY SCORE: 24

## 2025-08-29 ASSESSMENT — PAIN - FUNCTIONAL ASSESSMENT
PAIN_FUNCTIONAL_ASSESSMENT: UNABLE TO SELF-REPORT
PAIN_FUNCTIONAL_ASSESSMENT: 0-10
PAIN_FUNCTIONAL_ASSESSMENT: UNABLE TO SELF-REPORT
PAIN_FUNCTIONAL_ASSESSMENT: 0-10
PAIN_FUNCTIONAL_ASSESSMENT: UNABLE TO SELF-REPORT
PAIN_FUNCTIONAL_ASSESSMENT: UNABLE TO SELF-REPORT
PAIN_FUNCTIONAL_ASSESSMENT: 0-10
PAIN_FUNCTIONAL_ASSESSMENT: UNABLE TO SELF-REPORT
PAIN_FUNCTIONAL_ASSESSMENT: UNABLE TO SELF-REPORT
PAIN_FUNCTIONAL_ASSESSMENT: 0-10
PAIN_FUNCTIONAL_ASSESSMENT: 0-10
PAIN_FUNCTIONAL_ASSESSMENT: UNABLE TO SELF-REPORT
PAIN_FUNCTIONAL_ASSESSMENT: UNABLE TO SELF-REPORT
PAIN_FUNCTIONAL_ASSESSMENT: 0-10
PAIN_FUNCTIONAL_ASSESSMENT: 0-10
PAIN_FUNCTIONAL_ASSESSMENT: UNABLE TO SELF-REPORT
PAIN_FUNCTIONAL_ASSESSMENT: UNABLE TO SELF-REPORT
PAIN_FUNCTIONAL_ASSESSMENT: 0-10
PAIN_FUNCTIONAL_ASSESSMENT: UNABLE TO SELF-REPORT

## 2025-08-29 ASSESSMENT — PAIN DESCRIPTION - LOCATION
LOCATION: ABDOMEN

## 2025-08-29 ASSESSMENT — PATIENT HEALTH QUESTIONNAIRE - PHQ9
1. LITTLE INTEREST OR PLEASURE IN DOING THINGS: NOT AT ALL
SUM OF ALL RESPONSES TO PHQ9 QUESTIONS 1 & 2: 0
2. FEELING DOWN, DEPRESSED OR HOPELESS: NOT AT ALL

## 2025-08-29 ASSESSMENT — PAIN SCALES - GENERAL
PAINLEVEL_OUTOF10: 9
PAINLEVEL_OUTOF10: 10 - WORST POSSIBLE PAIN
PAINLEVEL_OUTOF10: 0 - NO PAIN
PAINLEVEL_OUTOF10: 0 - NO PAIN
PAINLEVEL_OUTOF10: 7
PAINLEVEL_OUTOF10: 7
PAINLEVEL_OUTOF10: 9
PAINLEVEL_OUTOF10: 0 - NO PAIN
PAINLEVEL_OUTOF10: 10 - WORST POSSIBLE PAIN
PAINLEVEL_OUTOF10: 10 - WORST POSSIBLE PAIN
PAINLEVEL_OUTOF10: 8
PAINLEVEL_OUTOF10: 10 - WORST POSSIBLE PAIN
PAINLEVEL_OUTOF10: 8
PAINLEVEL_OUTOF10: 8

## 2025-08-29 ASSESSMENT — LIFESTYLE VARIABLES
AUDIT-C TOTAL SCORE: 0
SKIP TO QUESTIONS 9-10: 1
SKIP TO QUESTIONS 9-10: 1
AUDIT-C TOTAL SCORE: 0

## 2025-08-30 LAB
ALBUMIN SERPL BCP-MCNC: 3.2 G/DL (ref 3.4–5)
ALP SERPL-CCNC: 37 U/L (ref 33–136)
ALT SERPL W P-5'-P-CCNC: 17 U/L (ref 10–52)
AMYLASE FLD-CCNC: 4337 U/L
ANION GAP SERPL CALC-SCNC: 11 MMOL/L (ref 10–20)
AST SERPL W P-5'-P-CCNC: 35 U/L (ref 9–39)
BILIRUB DIRECT SERPL-MCNC: 0.1 MG/DL (ref 0–0.3)
BILIRUB SERPL-MCNC: 0.9 MG/DL (ref 0–1.2)
BUN SERPL-MCNC: 16 MG/DL (ref 6–23)
CALCIUM SERPL-MCNC: 8.1 MG/DL (ref 8.6–10.6)
CHLORIDE SERPL-SCNC: 102 MMOL/L (ref 98–107)
CO2 SERPL-SCNC: 29 MMOL/L (ref 21–32)
CREAT SERPL-MCNC: 0.95 MG/DL (ref 0.5–1.3)
EGFRCR SERPLBLD CKD-EPI 2021: 86 ML/MIN/1.73M*2
ERYTHROCYTE [DISTWIDTH] IN BLOOD BY AUTOMATED COUNT: 14.3 % (ref 11.5–14.5)
GLUCOSE BLD MANUAL STRIP-MCNC: 138 MG/DL (ref 74–99)
GLUCOSE BLD MANUAL STRIP-MCNC: 155 MG/DL (ref 74–99)
GLUCOSE BLD MANUAL STRIP-MCNC: 160 MG/DL (ref 74–99)
GLUCOSE BLD MANUAL STRIP-MCNC: 204 MG/DL (ref 74–99)
GLUCOSE SERPL-MCNC: 158 MG/DL (ref 74–99)
HCT VFR BLD AUTO: 35.1 % (ref 41–52)
HGB BLD-MCNC: 11.2 G/DL (ref 13.5–17.5)
MCH RBC QN AUTO: 31.2 PG (ref 26–34)
MCHC RBC AUTO-ENTMCNC: 31.9 G/DL (ref 32–36)
MCV RBC AUTO: 98 FL (ref 80–100)
NRBC BLD-RTO: 0 /100 WBCS (ref 0–0)
PHOSPHATE SERPL-MCNC: 2.3 MG/DL (ref 2.5–4.9)
PLATELET # BLD AUTO: 169 X10*3/UL (ref 150–450)
POTASSIUM SERPL-SCNC: 4.9 MMOL/L (ref 3.5–5.3)
PROT SERPL-MCNC: 5.4 G/DL (ref 6.4–8.2)
RBC # BLD AUTO: 3.59 X10*6/UL (ref 4.5–5.9)
SODIUM SERPL-SCNC: 137 MMOL/L (ref 136–145)
WBC # BLD AUTO: 9.3 X10*3/UL (ref 4.4–11.3)

## 2025-08-30 PROCEDURE — 1170000001 HC PRIVATE ONCOLOGY ROOM DAILY

## 2025-08-30 PROCEDURE — 2500000005 HC RX 250 GENERAL PHARMACY W/O HCPCS: Performed by: STUDENT IN AN ORGANIZED HEALTH CARE EDUCATION/TRAINING PROGRAM

## 2025-08-30 PROCEDURE — 80048 BASIC METABOLIC PNL TOTAL CA: CPT

## 2025-08-30 PROCEDURE — 82150 ASSAY OF AMYLASE: CPT

## 2025-08-30 PROCEDURE — 2500000004 HC RX 250 GENERAL PHARMACY W/ HCPCS (ALT 636 FOR OP/ED): Performed by: STUDENT IN AN ORGANIZED HEALTH CARE EDUCATION/TRAINING PROGRAM

## 2025-08-30 PROCEDURE — 2500000002 HC RX 250 W HCPCS SELF ADMINISTERED DRUGS (ALT 637 FOR MEDICARE OP, ALT 636 FOR OP/ED): Performed by: NURSE PRACTITIONER

## 2025-08-30 PROCEDURE — 2500000004 HC RX 250 GENERAL PHARMACY W/ HCPCS (ALT 636 FOR OP/ED): Performed by: NURSE PRACTITIONER

## 2025-08-30 PROCEDURE — 2500000001 HC RX 250 WO HCPCS SELF ADMINISTERED DRUGS (ALT 637 FOR MEDICARE OP)

## 2025-08-30 PROCEDURE — 36415 COLL VENOUS BLD VENIPUNCTURE: CPT

## 2025-08-30 PROCEDURE — 84075 ASSAY ALKALINE PHOSPHATASE: CPT | Performed by: STUDENT IN AN ORGANIZED HEALTH CARE EDUCATION/TRAINING PROGRAM

## 2025-08-30 PROCEDURE — 82947 ASSAY GLUCOSE BLOOD QUANT: CPT

## 2025-08-30 PROCEDURE — 84100 ASSAY OF PHOSPHORUS: CPT

## 2025-08-30 PROCEDURE — 2500000004 HC RX 250 GENERAL PHARMACY W/ HCPCS (ALT 636 FOR OP/ED)

## 2025-08-30 PROCEDURE — 85027 COMPLETE CBC AUTOMATED: CPT

## 2025-08-30 RX ORDER — KETOROLAC TROMETHAMINE 15 MG/ML
15 INJECTION, SOLUTION INTRAMUSCULAR; INTRAVENOUS EVERY 6 HOURS
Status: DISPENSED | OUTPATIENT
Start: 2025-08-30 | End: 2025-08-31

## 2025-08-30 RX ADMIN — HEPARIN SODIUM 5000 UNITS: 5000 INJECTION, SOLUTION INTRAVENOUS; SUBCUTANEOUS at 17:52

## 2025-08-30 RX ADMIN — KETOROLAC TROMETHAMINE 15 MG: 15 INJECTION, SOLUTION INTRAMUSCULAR; INTRAVENOUS at 16:15

## 2025-08-30 RX ADMIN — POLYETHYLENE GLYCOL 3350 17 G: 17 POWDER, FOR SOLUTION ORAL at 10:06

## 2025-08-30 RX ADMIN — ACETAMINOPHEN 650 MG: 325 TABLET ORAL at 06:00

## 2025-08-30 RX ADMIN — INSULIN LISPRO 4 UNITS: 100 INJECTION, SOLUTION INTRAVENOUS; SUBCUTANEOUS at 00:08

## 2025-08-30 RX ADMIN — KETOROLAC TROMETHAMINE 15 MG: 15 INJECTION, SOLUTION INTRAMUSCULAR; INTRAVENOUS at 10:06

## 2025-08-30 RX ADMIN — SODIUM CHLORIDE, SODIUM LACTATE, POTASSIUM CHLORIDE, AND CALCIUM CHLORIDE 125 ML/HR: .6; .31; .03; .02 INJECTION, SOLUTION INTRAVENOUS at 01:05

## 2025-08-30 RX ADMIN — ACETAMINOPHEN 650 MG: 325 TABLET ORAL at 11:35

## 2025-08-30 RX ADMIN — SODIUM PHOSPHATE, MONOBASIC, MONOHYDRATE AND SODIUM PHOSPHATE, DIBASIC, ANHYDROUS 15 MMOL: 142; 276 INJECTION, SOLUTION INTRAVENOUS at 12:21

## 2025-08-30 RX ADMIN — PANTOPRAZOLE SODIUM 40 MG: 40 INJECTION, POWDER, FOR SOLUTION INTRAVENOUS at 10:06

## 2025-08-30 RX ADMIN — ACETAMINOPHEN 650 MG: 325 TABLET ORAL at 17:51

## 2025-08-30 RX ADMIN — SODIUM CHLORIDE, SODIUM LACTATE, POTASSIUM CHLORIDE, AND CALCIUM CHLORIDE 500 ML: .6; .31; .03; .02 INJECTION, SOLUTION INTRAVENOUS at 17:52

## 2025-08-30 RX ADMIN — HEPARIN SODIUM 5000 UNITS: 5000 INJECTION, SOLUTION INTRAVENOUS; SUBCUTANEOUS at 10:06

## 2025-08-30 RX ADMIN — INSULIN LISPRO 2 UNITS: 100 INJECTION, SOLUTION INTRAVENOUS; SUBCUTANEOUS at 06:22

## 2025-08-30 ASSESSMENT — PAIN - FUNCTIONAL ASSESSMENT
PAIN_FUNCTIONAL_ASSESSMENT: 0-10

## 2025-08-30 ASSESSMENT — COGNITIVE AND FUNCTIONAL STATUS - GENERAL
DAILY ACTIVITIY SCORE: 20
MOVING TO AND FROM BED TO CHAIR: A LITTLE
CLIMB 3 TO 5 STEPS WITH RAILING: A LITTLE
TOILETING: A LITTLE
DRESSING REGULAR LOWER BODY CLOTHING: A LITTLE
STANDING UP FROM CHAIR USING ARMS: A LITTLE
DRESSING REGULAR UPPER BODY CLOTHING: A LITTLE
MOBILITY SCORE: 20
HELP NEEDED FOR BATHING: A LITTLE
WALKING IN HOSPITAL ROOM: A LITTLE

## 2025-08-30 ASSESSMENT — PAIN SCALES - GENERAL
PAINLEVEL_OUTOF10: 7
PAINLEVEL_OUTOF10: 7
PAINLEVEL_OUTOF10: 5 - MODERATE PAIN
PAINLEVEL_OUTOF10: 6
PAINLEVEL_OUTOF10: 6

## 2025-08-31 VITALS
WEIGHT: 199 LBS | DIASTOLIC BLOOD PRESSURE: 84 MMHG | HEIGHT: 70 IN | OXYGEN SATURATION: 93 % | SYSTOLIC BLOOD PRESSURE: 136 MMHG | HEART RATE: 81 BPM | BODY MASS INDEX: 28.49 KG/M2 | TEMPERATURE: 99.3 F | RESPIRATION RATE: 18 BRPM

## 2025-08-31 LAB
ALBUMIN SERPL BCP-MCNC: 3.1 G/DL (ref 3.4–5)
ALP SERPL-CCNC: 44 U/L (ref 33–136)
ALT SERPL W P-5'-P-CCNC: 22 U/L (ref 10–52)
ANION GAP SERPL CALC-SCNC: 7 MMOL/L (ref 10–20)
AST SERPL W P-5'-P-CCNC: 53 U/L (ref 9–39)
BILIRUB SERPL-MCNC: 0.9 MG/DL (ref 0–1.2)
BUN SERPL-MCNC: 14 MG/DL (ref 6–23)
CALCIUM SERPL-MCNC: 8.2 MG/DL (ref 8.6–10.6)
CHLORIDE SERPL-SCNC: 99 MMOL/L (ref 98–107)
CO2 SERPL-SCNC: 36 MMOL/L (ref 21–32)
CREAT SERPL-MCNC: 0.85 MG/DL (ref 0.5–1.3)
EGFRCR SERPLBLD CKD-EPI 2021: >90 ML/MIN/1.73M*2
ERYTHROCYTE [DISTWIDTH] IN BLOOD BY AUTOMATED COUNT: 14.2 % (ref 11.5–14.5)
GLUCOSE BLD MANUAL STRIP-MCNC: 116 MG/DL (ref 74–99)
GLUCOSE BLD MANUAL STRIP-MCNC: 119 MG/DL (ref 74–99)
GLUCOSE BLD MANUAL STRIP-MCNC: 123 MG/DL (ref 74–99)
GLUCOSE BLD MANUAL STRIP-MCNC: 128 MG/DL (ref 74–99)
GLUCOSE BLD MANUAL STRIP-MCNC: 128 MG/DL (ref 74–99)
GLUCOSE BLD MANUAL STRIP-MCNC: 130 MG/DL (ref 74–99)
GLUCOSE SERPL-MCNC: 129 MG/DL (ref 74–99)
HCT VFR BLD AUTO: 30.9 % (ref 41–52)
HGB BLD-MCNC: 10 G/DL (ref 13.5–17.5)
MAGNESIUM SERPL-MCNC: 1.8 MG/DL (ref 1.6–2.4)
MCH RBC QN AUTO: 31.5 PG (ref 26–34)
MCHC RBC AUTO-ENTMCNC: 32.4 G/DL (ref 32–36)
MCV RBC AUTO: 98 FL (ref 80–100)
NRBC BLD-RTO: 0 /100 WBCS (ref 0–0)
PHOSPHATE SERPL-MCNC: 1.3 MG/DL (ref 2.5–4.9)
PLATELET # BLD AUTO: 152 X10*3/UL (ref 150–450)
POTASSIUM SERPL-SCNC: 4.7 MMOL/L (ref 3.5–5.3)
PROT SERPL-MCNC: 5.4 G/DL (ref 6.4–8.2)
RBC # BLD AUTO: 3.17 X10*6/UL (ref 4.5–5.9)
SODIUM SERPL-SCNC: 137 MMOL/L (ref 136–145)
WBC # BLD AUTO: 10.6 X10*3/UL (ref 4.4–11.3)

## 2025-08-31 PROCEDURE — 2500000004 HC RX 250 GENERAL PHARMACY W/ HCPCS (ALT 636 FOR OP/ED): Performed by: STUDENT IN AN ORGANIZED HEALTH CARE EDUCATION/TRAINING PROGRAM

## 2025-08-31 PROCEDURE — 83735 ASSAY OF MAGNESIUM: CPT | Performed by: STUDENT IN AN ORGANIZED HEALTH CARE EDUCATION/TRAINING PROGRAM

## 2025-08-31 PROCEDURE — 2500000004 HC RX 250 GENERAL PHARMACY W/ HCPCS (ALT 636 FOR OP/ED)

## 2025-08-31 PROCEDURE — 2500000005 HC RX 250 GENERAL PHARMACY W/O HCPCS: Performed by: STUDENT IN AN ORGANIZED HEALTH CARE EDUCATION/TRAINING PROGRAM

## 2025-08-31 PROCEDURE — 80053 COMPREHEN METABOLIC PANEL: CPT | Performed by: STUDENT IN AN ORGANIZED HEALTH CARE EDUCATION/TRAINING PROGRAM

## 2025-08-31 PROCEDURE — 85027 COMPLETE CBC AUTOMATED: CPT | Performed by: STUDENT IN AN ORGANIZED HEALTH CARE EDUCATION/TRAINING PROGRAM

## 2025-08-31 PROCEDURE — 2500000004 HC RX 250 GENERAL PHARMACY W/ HCPCS (ALT 636 FOR OP/ED): Performed by: NURSE PRACTITIONER

## 2025-08-31 PROCEDURE — 1170000001 HC PRIVATE ONCOLOGY ROOM DAILY

## 2025-08-31 PROCEDURE — 36415 COLL VENOUS BLD VENIPUNCTURE: CPT | Performed by: STUDENT IN AN ORGANIZED HEALTH CARE EDUCATION/TRAINING PROGRAM

## 2025-08-31 PROCEDURE — 82947 ASSAY GLUCOSE BLOOD QUANT: CPT

## 2025-08-31 PROCEDURE — 2500000001 HC RX 250 WO HCPCS SELF ADMINISTERED DRUGS (ALT 637 FOR MEDICARE OP)

## 2025-08-31 PROCEDURE — 84100 ASSAY OF PHOSPHORUS: CPT | Performed by: STUDENT IN AN ORGANIZED HEALTH CARE EDUCATION/TRAINING PROGRAM

## 2025-08-31 RX ORDER — MAGNESIUM SULFATE HEPTAHYDRATE 40 MG/ML
2 INJECTION, SOLUTION INTRAVENOUS ONCE
Status: COMPLETED | OUTPATIENT
Start: 2025-08-31 | End: 2025-08-31

## 2025-08-31 RX ADMIN — KETOROLAC TROMETHAMINE 15 MG: 15 INJECTION, SOLUTION INTRAMUSCULAR; INTRAVENOUS at 05:19

## 2025-08-31 RX ADMIN — HEPARIN SODIUM 5000 UNITS: 5000 INJECTION, SOLUTION INTRAVENOUS; SUBCUTANEOUS at 17:57

## 2025-08-31 RX ADMIN — KETOROLAC TROMETHAMINE 15 MG: 15 INJECTION, SOLUTION INTRAMUSCULAR; INTRAVENOUS at 12:03

## 2025-08-31 RX ADMIN — ACETAMINOPHEN 650 MG: 325 TABLET ORAL at 23:29

## 2025-08-31 RX ADMIN — SODIUM PHOSPHATE, MONOBASIC, MONOHYDRATE AND SODIUM PHOSPHATE, DIBASIC, ANHYDROUS 30 MMOL: 142; 276 INJECTION, SOLUTION INTRAVENOUS at 12:10

## 2025-08-31 RX ADMIN — ACETAMINOPHEN 650 MG: 325 TABLET ORAL at 12:03

## 2025-08-31 RX ADMIN — POLYETHYLENE GLYCOL 3350 17 G: 17 POWDER, FOR SOLUTION ORAL at 08:56

## 2025-08-31 RX ADMIN — HEPARIN SODIUM 5000 UNITS: 5000 INJECTION, SOLUTION INTRAVENOUS; SUBCUTANEOUS at 00:59

## 2025-08-31 RX ADMIN — HEPARIN SODIUM 5000 UNITS: 5000 INJECTION, SOLUTION INTRAVENOUS; SUBCUTANEOUS at 08:54

## 2025-08-31 RX ADMIN — ACETAMINOPHEN 650 MG: 325 TABLET ORAL at 05:19

## 2025-08-31 RX ADMIN — PANTOPRAZOLE SODIUM 40 MG: 40 INJECTION, POWDER, FOR SOLUTION INTRAVENOUS at 08:55

## 2025-08-31 RX ADMIN — MAGNESIUM SULFATE HEPTAHYDRATE 2 G: 40 INJECTION, SOLUTION INTRAVENOUS at 09:58

## 2025-08-31 RX ADMIN — ACETAMINOPHEN 650 MG: 325 TABLET ORAL at 17:57

## 2025-08-31 ASSESSMENT — COGNITIVE AND FUNCTIONAL STATUS - GENERAL
CLIMB 3 TO 5 STEPS WITH RAILING: A LITTLE
DRESSING REGULAR UPPER BODY CLOTHING: A LITTLE
WALKING IN HOSPITAL ROOM: A LITTLE
DAILY ACTIVITIY SCORE: 21
TOILETING: A LITTLE
MOBILITY SCORE: 21
HELP NEEDED FOR BATHING: A LITTLE
STANDING UP FROM CHAIR USING ARMS: A LITTLE

## 2025-08-31 ASSESSMENT — PAIN SCALES - GENERAL
PAINLEVEL_OUTOF10: 3
PAINLEVEL_OUTOF10: 3
PAINLEVEL_OUTOF10: 2
PAINLEVEL_OUTOF10: 4

## 2025-08-31 ASSESSMENT — PAIN - FUNCTIONAL ASSESSMENT
PAIN_FUNCTIONAL_ASSESSMENT: 0-10

## 2025-09-01 LAB
ALBUMIN SERPL BCP-MCNC: 2.9 G/DL (ref 3.4–5)
ALP SERPL-CCNC: 49 U/L (ref 33–136)
ALT SERPL W P-5'-P-CCNC: 21 U/L (ref 10–52)
ANION GAP SERPL CALC-SCNC: 10 MMOL/L (ref 10–20)
AST SERPL W P-5'-P-CCNC: 41 U/L (ref 9–39)
BILIRUB SERPL-MCNC: 0.7 MG/DL (ref 0–1.2)
BUN SERPL-MCNC: 10 MG/DL (ref 6–23)
CALCIUM SERPL-MCNC: 8 MG/DL (ref 8.6–10.6)
CHLORIDE SERPL-SCNC: 99 MMOL/L (ref 98–107)
CO2 SERPL-SCNC: 31 MMOL/L (ref 21–32)
CREAT SERPL-MCNC: 0.82 MG/DL (ref 0.5–1.3)
EGFRCR SERPLBLD CKD-EPI 2021: >90 ML/MIN/1.73M*2
ERYTHROCYTE [DISTWIDTH] IN BLOOD BY AUTOMATED COUNT: 13.6 % (ref 11.5–14.5)
GLUCOSE BLD MANUAL STRIP-MCNC: 117 MG/DL (ref 74–99)
GLUCOSE BLD MANUAL STRIP-MCNC: 130 MG/DL (ref 74–99)
GLUCOSE BLD MANUAL STRIP-MCNC: 147 MG/DL (ref 74–99)
GLUCOSE SERPL-MCNC: 109 MG/DL (ref 74–99)
HCT VFR BLD AUTO: 26.8 % (ref 41–52)
HGB BLD-MCNC: 9.2 G/DL (ref 13.5–17.5)
MAGNESIUM SERPL-MCNC: 1.81 MG/DL (ref 1.6–2.4)
MCH RBC QN AUTO: 31.7 PG (ref 26–34)
MCHC RBC AUTO-ENTMCNC: 34.3 G/DL (ref 32–36)
MCV RBC AUTO: 92 FL (ref 80–100)
NRBC BLD-RTO: 0 /100 WBCS (ref 0–0)
PHOSPHATE SERPL-MCNC: 2 MG/DL (ref 2.5–4.9)
PLATELET # BLD AUTO: 156 X10*3/UL (ref 150–450)
POTASSIUM SERPL-SCNC: 3.7 MMOL/L (ref 3.5–5.3)
PROT SERPL-MCNC: 5.3 G/DL (ref 6.4–8.2)
RBC # BLD AUTO: 2.9 X10*6/UL (ref 4.5–5.9)
SODIUM SERPL-SCNC: 136 MMOL/L (ref 136–145)
WBC # BLD AUTO: 10.4 X10*3/UL (ref 4.4–11.3)

## 2025-09-01 RX ADMIN — HEPARIN SODIUM 5000 UNITS: 5000 INJECTION, SOLUTION INTRAVENOUS; SUBCUTANEOUS at 00:45

## 2025-09-02 LAB
ALBUMIN SERPL BCP-MCNC: 3.2 G/DL (ref 3.4–5)
ALP SERPL-CCNC: 57 U/L (ref 33–136)
ALT SERPL W P-5'-P-CCNC: 23 U/L (ref 10–52)
AMYLASE FLD-CCNC: 1171 U/L
ANION GAP SERPL CALC-SCNC: 11 MMOL/L (ref 10–20)
AST SERPL W P-5'-P-CCNC: 31 U/L (ref 9–39)
BILIRUB SERPL-MCNC: 0.8 MG/DL (ref 0–1.2)
BUN SERPL-MCNC: 10 MG/DL (ref 6–23)
CALCIUM SERPL-MCNC: 8.6 MG/DL (ref 8.6–10.6)
CHLORIDE SERPL-SCNC: 99 MMOL/L (ref 98–107)
CO2 SERPL-SCNC: 32 MMOL/L (ref 21–32)
CREAT SERPL-MCNC: 0.79 MG/DL (ref 0.5–1.3)
EGFRCR SERPLBLD CKD-EPI 2021: >90 ML/MIN/1.73M*2
ERYTHROCYTE [DISTWIDTH] IN BLOOD BY AUTOMATED COUNT: 13.9 % (ref 11.5–14.5)
GLUCOSE BLD MANUAL STRIP-MCNC: 126 MG/DL (ref 74–99)
GLUCOSE BLD MANUAL STRIP-MCNC: 134 MG/DL (ref 74–99)
GLUCOSE BLD MANUAL STRIP-MCNC: 170 MG/DL (ref 74–99)
GLUCOSE SERPL-MCNC: 131 MG/DL (ref 74–99)
HCT VFR BLD AUTO: 28.3 % (ref 41–52)
HGB BLD-MCNC: 9.2 G/DL (ref 13.5–17.5)
MAGNESIUM SERPL-MCNC: 1.88 MG/DL (ref 1.6–2.4)
MCH RBC QN AUTO: 31.3 PG (ref 26–34)
MCHC RBC AUTO-ENTMCNC: 32.5 G/DL (ref 32–36)
MCV RBC AUTO: 96 FL (ref 80–100)
NRBC BLD-RTO: 0 /100 WBCS (ref 0–0)
PHOSPHATE SERPL-MCNC: 2.7 MG/DL (ref 2.5–4.9)
PLATELET # BLD AUTO: 209 X10*3/UL (ref 150–450)
POTASSIUM SERPL-SCNC: 4.6 MMOL/L (ref 3.5–5.3)
PROT SERPL-MCNC: 5.9 G/DL (ref 6.4–8.2)
RBC # BLD AUTO: 2.94 X10*6/UL (ref 4.5–5.9)
SODIUM SERPL-SCNC: 137 MMOL/L (ref 136–145)
WBC # BLD AUTO: 9.1 X10*3/UL (ref 4.4–11.3)

## 2025-09-03 ENCOUNTER — HOME HEALTH ADMISSION (OUTPATIENT)
Dept: HOME HEALTH SERVICES | Facility: HOME HEALTH | Age: 70
End: 2025-09-03
Payer: MEDICARE

## 2025-09-03 ENCOUNTER — DOCUMENTATION (OUTPATIENT)
Dept: HOME HEALTH SERVICES | Facility: HOME HEALTH | Age: 70
End: 2025-09-03
Payer: MEDICARE

## 2025-09-03 LAB
ALBUMIN SERPL BCP-MCNC: 3.1 G/DL (ref 3.4–5)
ALP SERPL-CCNC: 59 U/L (ref 33–136)
ALT SERPL W P-5'-P-CCNC: 26 U/L (ref 10–52)
ANION GAP SERPL CALC-SCNC: 12 MMOL/L (ref 10–20)
AST SERPL W P-5'-P-CCNC: 26 U/L (ref 9–39)
BILIRUB SERPL-MCNC: 0.7 MG/DL (ref 0–1.2)
BUN SERPL-MCNC: 10 MG/DL (ref 6–23)
CALCIUM SERPL-MCNC: 8.4 MG/DL (ref 8.6–10.6)
CHLORIDE SERPL-SCNC: 101 MMOL/L (ref 98–107)
CO2 SERPL-SCNC: 28 MMOL/L (ref 21–32)
CREAT SERPL-MCNC: 0.71 MG/DL (ref 0.5–1.3)
EGFRCR SERPLBLD CKD-EPI 2021: >90 ML/MIN/1.73M*2
ERYTHROCYTE [DISTWIDTH] IN BLOOD BY AUTOMATED COUNT: 13.8 % (ref 11.5–14.5)
GLUCOSE BLD MANUAL STRIP-MCNC: 133 MG/DL (ref 74–99)
GLUCOSE SERPL-MCNC: 116 MG/DL (ref 74–99)
HCT VFR BLD AUTO: 28.5 % (ref 41–52)
HGB BLD-MCNC: 9.2 G/DL (ref 13.5–17.5)
MAGNESIUM SERPL-MCNC: 1.83 MG/DL (ref 1.6–2.4)
MCH RBC QN AUTO: 31.2 PG (ref 26–34)
MCHC RBC AUTO-ENTMCNC: 32.3 G/DL (ref 32–36)
MCV RBC AUTO: 97 FL (ref 80–100)
NRBC BLD-RTO: 0 /100 WBCS (ref 0–0)
PHOSPHATE SERPL-MCNC: 3.1 MG/DL (ref 2.5–4.9)
PLATELET # BLD AUTO: 239 X10*3/UL (ref 150–450)
POTASSIUM SERPL-SCNC: 3.9 MMOL/L (ref 3.5–5.3)
PROT SERPL-MCNC: 5.8 G/DL (ref 6.4–8.2)
RBC # BLD AUTO: 2.95 X10*6/UL (ref 4.5–5.9)
SODIUM SERPL-SCNC: 137 MMOL/L (ref 136–145)
WBC # BLD AUTO: 8.7 X10*3/UL (ref 4.4–11.3)

## 2025-09-04 ENCOUNTER — HOME CARE VISIT (OUTPATIENT)
Dept: HOME HEALTH SERVICES | Facility: HOME HEALTH | Age: 70
End: 2025-09-04
Payer: MEDICARE

## 2025-09-04 PROCEDURE — 169592 NO-PAY CLAIM PROCEDURE

## 2025-09-04 PROCEDURE — G0299 HHS/HOSPICE OF RN EA 15 MIN: HCPCS | Mod: HHH

## 2025-09-05 ENCOUNTER — HOME CARE VISIT (OUTPATIENT)
Dept: HOME HEALTH SERVICES | Facility: HOME HEALTH | Age: 70
End: 2025-09-05
Payer: MEDICARE

## 2025-09-05 VITALS
OXYGEN SATURATION: 97 % | DIASTOLIC BLOOD PRESSURE: 82 MMHG | TEMPERATURE: 97.2 F | HEART RATE: 78 BPM | SYSTOLIC BLOOD PRESSURE: 126 MMHG

## 2025-09-05 PROCEDURE — G0151 HHCP-SERV OF PT,EA 15 MIN: HCPCS | Mod: HHH

## 2025-09-05 ASSESSMENT — BALANCE ASSESSMENTS
ARISES: 2 - ABLE WITHOUT USING ARMS
NUDGED: 2 - STEADY
SITTING DOWN: 2 - SAFE, SMOOTH MOTION
TURNING 360 DEGREES STEPS: 1 - CONTINUOUS STEPS
STANDING BALANCE: 2 - NARROW STANCE WITHOUT SUPPORT
NUDGED SCORE: 2
EYES CLOSED AT MAXIMUM POSITION NUDGED: 1 - STEADY
ARISING SCORE: 2
SITTING BALANCE: 1 - STEADY, SAFE
ATTEMPTS TO ARISE: 2 - ABLE TO RISE, ONE ATTEMPT
BALANCE SCORE: 16
IMMEDIATE STANDING BALANCE FIRST 5 SECONDS: 2 - STEADY WITHOUT WALKER OR OTHER SUPPORT

## 2025-09-05 ASSESSMENT — GAIT ASSESSMENTS
TRUNK SCORE: 2
INITIATION OF GAIT IMMEDIATELY AFTER GO: 1 - NO HESITANCY
PATH: 2 - STRAIGHT WITHOUT WALKING AID
TRUNK: 2 - NO SWAY, NO FLEXION, NO USE OF ARMS, NO WALKING AID
WALKING STANCE: 1 - HEELS ALMOST TOUCHING WHILE WALKING
GAIT SCORE: 12
STEP CONTINUITY: 1 - STEPS APPEAR CONTINUOUS
BALANCE AND GAIT SCORE: 28
PATH SCORE: 2
STEP SYMMETRY: 1 - RIGHT AND LEFT STEP LENGTH APPEAR EQUAL

## 2025-09-05 ASSESSMENT — ENCOUNTER SYMPTOMS
PERSON REPORTING PAIN: PATIENT
DENIES PAIN: 1

## 2025-09-06 VITALS
WEIGHT: 194 LBS | OXYGEN SATURATION: 95 % | HEIGHT: 70 IN | SYSTOLIC BLOOD PRESSURE: 107 MMHG | DIASTOLIC BLOOD PRESSURE: 76 MMHG | TEMPERATURE: 97.7 F | BODY MASS INDEX: 27.77 KG/M2 | RESPIRATION RATE: 16 BRPM | HEART RATE: 78 BPM

## 2025-09-06 ASSESSMENT — ENCOUNTER SYMPTOMS
ANGER WITHIN DEFINED LIMITS: 1
AGGRESSION WITHIN DEFINED LIMITS: 1
PAIN LOCATION: ABDOMEN
DENIES PAIN: 1
APPETITE LEVEL: GOOD
PERSON REPORTING PAIN: PATIENT
PAIN LOCATION - PAIN SEVERITY: 1/10
PAIN LOCATION - PAIN FREQUENCY: INTERMITTENT
PAIN LOCATION - PAIN QUALITY: INCISIONAL
SLEEP QUALITY: ADEQUATE
ABDOMINAL PAIN: 1
OCCASIONAL FEELINGS OF UNSTEADINESS: 0
HIGHEST PAIN SEVERITY IN PAST 24 HOURS: 8/10

## 2025-09-06 ASSESSMENT — ACTIVITIES OF DAILY LIVING (ADL)
ENTERING_EXITING_HOME: INDEPENDENT
OASIS_M1830: 03

## 2025-09-08 ENCOUNTER — APPOINTMENT (OUTPATIENT)
Dept: SURGICAL ONCOLOGY | Facility: CLINIC | Age: 70
End: 2025-09-08
Payer: MEDICARE

## 2026-03-05 ENCOUNTER — APPOINTMENT (OUTPATIENT)
Dept: PRIMARY CARE | Facility: CLINIC | Age: 71
End: 2026-03-05
Payer: MEDICARE

## 2026-08-24 ENCOUNTER — APPOINTMENT (OUTPATIENT)
Dept: DERMATOLOGY | Facility: CLINIC | Age: 71
End: 2026-08-24
Payer: MEDICARE

## 2026-08-27 ENCOUNTER — APPOINTMENT (OUTPATIENT)
Dept: DERMATOLOGY | Facility: CLINIC | Age: 71
End: 2026-08-27
Payer: MEDICARE